# Patient Record
Sex: MALE | Race: WHITE | NOT HISPANIC OR LATINO | URBAN - METROPOLITAN AREA
[De-identification: names, ages, dates, MRNs, and addresses within clinical notes are randomized per-mention and may not be internally consistent; named-entity substitution may affect disease eponyms.]

---

## 2018-06-17 ENCOUNTER — EMERGENCY (EMERGENCY)
Facility: HOSPITAL | Age: 58
LOS: 1 days | Discharge: HOME | End: 2018-06-17
Attending: EMERGENCY MEDICINE
Payer: COMMERCIAL

## 2018-06-17 VITALS
HEART RATE: 70 BPM | SYSTOLIC BLOOD PRESSURE: 128 MMHG | WEIGHT: 259.93 LBS | RESPIRATION RATE: 21 BRPM | DIASTOLIC BLOOD PRESSURE: 58 MMHG | OXYGEN SATURATION: 98 % | TEMPERATURE: 98 F | HEIGHT: 69 IN

## 2018-06-17 LAB
ALBUMIN SERPL ELPH-MCNC: 4.2 G/DL — SIGNIFICANT CHANGE UP (ref 3.3–5.2)
ALP SERPL-CCNC: 64 U/L — SIGNIFICANT CHANGE UP (ref 40–120)
ALT FLD-CCNC: 22 U/L — SIGNIFICANT CHANGE UP
ANION GAP SERPL CALC-SCNC: 18 MMOL/L — HIGH (ref 5–17)
APTT BLD: 22.7 SEC — LOW (ref 27.5–37.4)
AST SERPL-CCNC: 19 U/L — SIGNIFICANT CHANGE UP
BASOPHILS # BLD AUTO: 0 K/UL — SIGNIFICANT CHANGE UP (ref 0–0.2)
BASOPHILS NFR BLD AUTO: 0.2 % — SIGNIFICANT CHANGE UP (ref 0–2)
BILIRUB SERPL-MCNC: 0.7 MG/DL — SIGNIFICANT CHANGE UP (ref 0.4–2)
BUN SERPL-MCNC: 15 MG/DL — SIGNIFICANT CHANGE UP (ref 8–20)
CALCIUM SERPL-MCNC: 9.5 MG/DL — SIGNIFICANT CHANGE UP (ref 8.6–10.2)
CHLORIDE SERPL-SCNC: 97 MMOL/L — LOW (ref 98–107)
CO2 SERPL-SCNC: 22 MMOL/L — SIGNIFICANT CHANGE UP (ref 22–29)
CREAT SERPL-MCNC: 1.25 MG/DL — SIGNIFICANT CHANGE UP (ref 0.5–1.3)
EOSINOPHIL # BLD AUTO: 0.3 K/UL — SIGNIFICANT CHANGE UP (ref 0–0.5)
EOSINOPHIL NFR BLD AUTO: 3 % — SIGNIFICANT CHANGE UP (ref 0–5)
GLUCOSE SERPL-MCNC: 190 MG/DL — HIGH (ref 70–115)
HCT VFR BLD CALC: 40.6 % — LOW (ref 42–52)
HGB BLD-MCNC: 14 G/DL — SIGNIFICANT CHANGE UP (ref 14–18)
INR BLD: 1.04 RATIO — SIGNIFICANT CHANGE UP (ref 0.88–1.16)
LIDOCAIN IGE QN: 48 U/L — SIGNIFICANT CHANGE UP (ref 22–51)
LYMPHOCYTES # BLD AUTO: 2.7 K/UL — SIGNIFICANT CHANGE UP (ref 1–4.8)
LYMPHOCYTES # BLD AUTO: 26.4 % — SIGNIFICANT CHANGE UP (ref 20–55)
MCHC RBC-ENTMCNC: 30.7 PG — SIGNIFICANT CHANGE UP (ref 27–31)
MCHC RBC-ENTMCNC: 34.5 G/DL — SIGNIFICANT CHANGE UP (ref 32–36)
MCV RBC AUTO: 89 FL — SIGNIFICANT CHANGE UP (ref 80–94)
MONOCYTES # BLD AUTO: 0.9 K/UL — HIGH (ref 0–0.8)
MONOCYTES NFR BLD AUTO: 8.7 % — SIGNIFICANT CHANGE UP (ref 3–10)
NEUTROPHILS # BLD AUTO: 6.2 K/UL — SIGNIFICANT CHANGE UP (ref 1.8–8)
NEUTROPHILS NFR BLD AUTO: 61 % — SIGNIFICANT CHANGE UP (ref 37–73)
NT-PROBNP SERPL-SCNC: 224 PG/ML — SIGNIFICANT CHANGE UP (ref 0–300)
PLATELET # BLD AUTO: 338 K/UL — SIGNIFICANT CHANGE UP (ref 150–400)
POTASSIUM SERPL-MCNC: 4 MMOL/L — SIGNIFICANT CHANGE UP (ref 3.5–5.3)
POTASSIUM SERPL-SCNC: 4 MMOL/L — SIGNIFICANT CHANGE UP (ref 3.5–5.3)
PROT SERPL-MCNC: 7.3 G/DL — SIGNIFICANT CHANGE UP (ref 6.6–8.7)
PROTHROM AB SERPL-ACNC: 11.4 SEC — SIGNIFICANT CHANGE UP (ref 9.8–12.7)
RBC # BLD: 4.56 M/UL — LOW (ref 4.6–6.2)
RBC # FLD: 13.1 % — SIGNIFICANT CHANGE UP (ref 11–15.6)
SODIUM SERPL-SCNC: 137 MMOL/L — SIGNIFICANT CHANGE UP (ref 135–145)
TROPONIN T SERPL-MCNC: <0.01 NG/ML — SIGNIFICANT CHANGE UP (ref 0–0.06)
TROPONIN T SERPL-MCNC: <0.01 NG/ML — SIGNIFICANT CHANGE UP (ref 0–0.06)
TSH SERPL-MCNC: 2.97 UIU/ML — SIGNIFICANT CHANGE UP (ref 0.27–4.2)
WBC # BLD: 10.2 K/UL — SIGNIFICANT CHANGE UP (ref 4.8–10.8)
WBC # FLD AUTO: 10.2 K/UL — SIGNIFICANT CHANGE UP (ref 4.8–10.8)

## 2018-06-17 PROCEDURE — 83690 ASSAY OF LIPASE: CPT

## 2018-06-17 PROCEDURE — 93010 ELECTROCARDIOGRAM REPORT: CPT | Mod: 59

## 2018-06-17 PROCEDURE — 83880 ASSAY OF NATRIURETIC PEPTIDE: CPT

## 2018-06-17 PROCEDURE — 80053 COMPREHEN METABOLIC PANEL: CPT

## 2018-06-17 PROCEDURE — 99284 EMERGENCY DEPT VISIT MOD MDM: CPT | Mod: 25

## 2018-06-17 PROCEDURE — 85027 COMPLETE CBC AUTOMATED: CPT

## 2018-06-17 PROCEDURE — 84484 ASSAY OF TROPONIN QUANT: CPT

## 2018-06-17 PROCEDURE — 96374 THER/PROPH/DIAG INJ IV PUSH: CPT

## 2018-06-17 PROCEDURE — 84443 ASSAY THYROID STIM HORMONE: CPT

## 2018-06-17 PROCEDURE — 36415 COLL VENOUS BLD VENIPUNCTURE: CPT

## 2018-06-17 PROCEDURE — 71045 X-RAY EXAM CHEST 1 VIEW: CPT

## 2018-06-17 PROCEDURE — 71045 X-RAY EXAM CHEST 1 VIEW: CPT | Mod: 26

## 2018-06-17 PROCEDURE — 85730 THROMBOPLASTIN TIME PARTIAL: CPT

## 2018-06-17 PROCEDURE — 99285 EMERGENCY DEPT VISIT HI MDM: CPT

## 2018-06-17 PROCEDURE — 85610 PROTHROMBIN TIME: CPT

## 2018-06-17 PROCEDURE — 93005 ELECTROCARDIOGRAM TRACING: CPT

## 2018-06-17 RX ORDER — ONDANSETRON 8 MG/1
4 TABLET, FILM COATED ORAL ONCE
Qty: 0 | Refills: 0 | Status: COMPLETED | OUTPATIENT
Start: 2018-06-17 | End: 2018-06-17

## 2018-06-17 RX ADMIN — ONDANSETRON 4 MILLIGRAM(S): 8 TABLET, FILM COATED ORAL at 19:45

## 2018-06-17 NOTE — ED PROVIDER NOTE - OBJECTIVE STATEMENT
56 y/o male pt with PMHx presents to the ED, brought in by ambulance, c/o nausea, vomiting, diaphoresis onset 45 min ago. Associated symptoms include sweating. Pt notes he took all his medicine today. He also notes he did not eat anything today. Denies CP or any pain in general. He also denies being a smoker or ETOH usage. No further complaints at this time.   PMD: Dr. Kaden Robison 58 y/o male pt with PMHx presents to the ED, brought in by ambulance, c/o nausea, vomiting, diaphoresis onset 45 min ago. Pt notes he took all his medicine today. He also notes he did not eat anything today. Denies CP or any pain in general. Nonsmoker. denies fever. denies HA or neck pain. no chest pain or sob. no abd pain.  no urinary f/u/d. no back pain. no motor or sensory deficits. denies illicit drug use. no recent travel. no rash. no other acute issues symptoms or concerns   PMD: Dr. Kaden Robison

## 2018-06-17 NOTE — ED ADULT NURSE REASSESSMENT NOTE - NS ED NURSE REASSESS COMMENT FT1
Patient a&ox4, able to make needs known., denies any pain or discomfort, not in respiratory distress. BP= 91/60, Dr. Harley aware, suggested IV fluids but MD give no order at this time.
As per MD, remove o2. Patient o2sat is 96% room air, denies sob or respiratory distress.

## 2018-06-17 NOTE — ED ADULT TRIAGE NOTE - CHIEF COMPLAINT QUOTE
Patient diaphoretic, complaining of nausea, & dizziness, vomiting upon arrival to ED. Stated sudden onset when eating this evening. Took 324mg baby aspirin prior to arrival. Stated family told him he "passed out." Dr. Harley at bedside for eval.

## 2018-06-17 NOTE — ED ADULT NURSE NOTE - DISCHARGE TEACHING
F/u with PMD In 24-48 hours, if with new or worsening symptoms call 911 or go to the nearest hospital

## 2018-06-17 NOTE — ED PROVIDER NOTE - NS ED NOTE AC HIGH RISK COUNTRIES
Benefits, risks, and possible complications of procedure explained to patient/caregiver who verbalized understanding and gave verbal consent. Benefits, risks, and possible complications of procedure explained to patient/caregiver who verbalized understanding and gave verbal consent. Benefits, risks, and possible complications of procedure explained to patient/caregiver who verbalized understanding and gave written consent. No

## 2018-06-17 NOTE — ED PROVIDER NOTE - PROGRESS NOTE DETAILS
b/l UE at least 3-/5, b/l LE at least 3-/5
Scribe Statement (Attending): I Crissy Pierre attest that this documentation has been prepared under the direction and in the presence of Doctor Harley

## 2018-06-17 NOTE — ED ADULT NURSE NOTE - OBJECTIVE STATEMENT
Received patient in B3R, a&ox4, able to make needs known. Patient received on o2 @ 2 lpm via nc, nsr on cm. Patient reports of episode of nausea, vomiting and diaphoresis onset after dinner. Patient's sister added patient passed out for 30 seconds then woke up before EMS arrived. Patient denies any pain or discomfort, denies chest pain. Patient received with right ac iv #18. Patient noted with no active vomiting, no nausea and diaphoresis at this time. Plan of care and wait time explained, patient verbalized understanding. Patient not in respiratory distress. PMH: MI, cardiac stents x 1. To continue to monitor.

## 2018-06-18 VITALS
OXYGEN SATURATION: 98 % | RESPIRATION RATE: 20 BRPM | DIASTOLIC BLOOD PRESSURE: 74 MMHG | TEMPERATURE: 98 F | SYSTOLIC BLOOD PRESSURE: 120 MMHG | HEART RATE: 70 BPM

## 2018-10-09 ENCOUNTER — APPOINTMENT (OUTPATIENT)
Dept: ENDOCRINOLOGY | Facility: CLINIC | Age: 58
End: 2018-10-09
Payer: COMMERCIAL

## 2018-10-09 VITALS
BODY MASS INDEX: 40.29 KG/M2 | HEIGHT: 69 IN | WEIGHT: 272 LBS | SYSTOLIC BLOOD PRESSURE: 100 MMHG | DIASTOLIC BLOOD PRESSURE: 60 MMHG | HEART RATE: 75 BPM

## 2018-10-09 DIAGNOSIS — Z78.9 OTHER SPECIFIED HEALTH STATUS: ICD-10-CM

## 2018-10-09 DIAGNOSIS — Z86.39 PERSONAL HISTORY OF OTHER ENDOCRINE, NUTRITIONAL AND METABOLIC DISEASE: ICD-10-CM

## 2018-10-09 DIAGNOSIS — I25.2 OLD MYOCARDIAL INFARCTION: ICD-10-CM

## 2018-10-09 PROCEDURE — 99244 OFF/OP CNSLTJ NEW/EST MOD 40: CPT | Mod: 25

## 2018-10-09 PROCEDURE — 95250 CONT GLUC MNTR PHYS/QHP EQP: CPT

## 2018-10-31 ENCOUNTER — APPOINTMENT (OUTPATIENT)
Dept: ENDOCRINOLOGY | Facility: CLINIC | Age: 58
End: 2018-10-31
Payer: COMMERCIAL

## 2018-10-31 VITALS
HEART RATE: 88 BPM | SYSTOLIC BLOOD PRESSURE: 132 MMHG | BODY MASS INDEX: 38.95 KG/M2 | DIASTOLIC BLOOD PRESSURE: 84 MMHG | WEIGHT: 263 LBS | HEIGHT: 69 IN

## 2018-10-31 PROCEDURE — 95251 CONT GLUC MNTR ANALYSIS I&R: CPT

## 2018-10-31 PROCEDURE — 99214 OFFICE O/P EST MOD 30 MIN: CPT | Mod: 25

## 2019-01-14 ENCOUNTER — APPOINTMENT (OUTPATIENT)
Dept: ENDOCRINOLOGY | Facility: CLINIC | Age: 59
End: 2019-01-14

## 2019-02-14 ENCOUNTER — MEDICATION RENEWAL (OUTPATIENT)
Age: 59
End: 2019-02-14

## 2019-02-14 ENCOUNTER — RX CHANGE (OUTPATIENT)
Age: 59
End: 2019-02-14

## 2019-04-16 LAB
ALBUMIN SERPL ELPH-MCNC: 4.5 G/DL
ALP BLD-CCNC: 69 U/L
ALT SERPL-CCNC: 33 U/L
ANION GAP SERPL CALC-SCNC: 16 MMOL/L
AST SERPL-CCNC: 20 U/L
BASOPHILS # BLD AUTO: 0.05 K/UL
BASOPHILS NFR BLD AUTO: 0.6 %
BILIRUB SERPL-MCNC: 0.4 MG/DL
BUN SERPL-MCNC: 15 MG/DL
CALCIUM SERPL-MCNC: 9.8 MG/DL
CHLORIDE SERPL-SCNC: 100 MMOL/L
CHOLEST SERPL-MCNC: 279 MG/DL
CHOLEST/HDLC SERPL: 6.5 RATIO
CO2 SERPL-SCNC: 26 MMOL/L
CREAT SERPL-MCNC: 0.92 MG/DL
CREAT SPEC-SCNC: 93 MG/DL
EOSINOPHIL # BLD AUTO: 0.29 K/UL
EOSINOPHIL NFR BLD AUTO: 3.6 %
GLUCOSE SERPL-MCNC: 279 MG/DL
HBA1C MFR BLD HPLC: 10.3 %
HCT VFR BLD CALC: 43.1 %
HDLC SERPL-MCNC: 43 MG/DL
HGB BLD-MCNC: 13.7 G/DL
IMM GRANULOCYTES NFR BLD AUTO: 0.4 %
LDLC SERPL CALC-MCNC: 176 MG/DL
LDLC SERPL DIRECT ASSAY-MCNC: 204 MG/DL
LYMPHOCYTES # BLD AUTO: 2.28 K/UL
LYMPHOCYTES NFR BLD AUTO: 27.9 %
MAN DIFF?: NORMAL
MCHC RBC-ENTMCNC: 30.4 PG
MCHC RBC-ENTMCNC: 31.8 GM/DL
MCV RBC AUTO: 95.8 FL
MICROALBUMIN 24H UR DL<=1MG/L-MCNC: <1.2 MG/DL
MICROALBUMIN/CREAT 24H UR-RTO: NORMAL MG/G
MONOCYTES # BLD AUTO: 0.86 K/UL
MONOCYTES NFR BLD AUTO: 10.5 %
NEUTROPHILS # BLD AUTO: 4.65 K/UL
NEUTROPHILS NFR BLD AUTO: 57 %
PLATELET # BLD AUTO: 312 K/UL
POTASSIUM SERPL-SCNC: 5.3 MMOL/L
PROT SERPL-MCNC: 6.9 G/DL
RBC # BLD: 4.5 M/UL
RBC # FLD: 12.8 %
SODIUM SERPL-SCNC: 142 MMOL/L
TRIGL SERPL-MCNC: 300 MG/DL
WBC # FLD AUTO: 8.16 K/UL

## 2019-04-18 ENCOUNTER — APPOINTMENT (OUTPATIENT)
Dept: ENDOCRINOLOGY | Facility: CLINIC | Age: 59
End: 2019-04-18
Payer: COMMERCIAL

## 2019-04-18 VITALS
SYSTOLIC BLOOD PRESSURE: 116 MMHG | HEIGHT: 69 IN | HEART RATE: 77 BPM | WEIGHT: 277 LBS | DIASTOLIC BLOOD PRESSURE: 74 MMHG | BODY MASS INDEX: 41.03 KG/M2

## 2019-04-18 PROCEDURE — 99214 OFFICE O/P EST MOD 30 MIN: CPT

## 2019-04-18 NOTE — ASSESSMENT
[Carbohydrate Consistent Diet] : carbohydrate consistent diet [Hypoglycemia Management] : hypoglycemia management [Diabetes Foot Care] : diabetes foot care [Long Term Vascular Complications] : long term vascular complications of diabetes [Importance of Diet and Exercise] : importance of diet and exercise to improve glycemic control, achieve weight loss and improve cardiovascular health [Self Monitoring of Blood Glucose] : self monitoring of blood glucose [FreeTextEntry1] : DM2 poorly controlled associated with HTN. HLD and morbid obesity. he gained another 14 lbs and A1c is up 10.3\par continue MF 1000 mg BID\par increase levemir to 65 u Hs (he forgot to do it) \par Humalog 30 u BID  w meals. \par discussed diet and exercise\par encouraged more exercise walking 30 min 3 x week\par eye exam August 2018: reportedly no DR \par bgs 3x day\par \par HTN:  bp at target on meds. Continue current management.\par discussed low salt diet \par \par HLD:  LDl at target. stop lipitor with crestor  40 mg qd \par \par obesity:  discussed diet and exercise\par encouraged more exercise walking 30 min 3 x week

## 2019-04-18 NOTE — PHYSICAL EXAM
[Alert] : alert [No Acute Distress] : no acute distress [Well Nourished] : well nourished [Well Developed] : well developed [Normal Sclera/Conjunctiva] : normal sclera/conjunctiva [EOMI] : extra ocular movement intact [No Proptosis] : no proptosis [Normal Oropharynx] : the oropharynx was normal [Thyroid Not Enlarged] : the thyroid was not enlarged [No Thyroid Nodules] : there were no palpable thyroid nodules [No Respiratory Distress] : no respiratory distress [No Accessory Muscle Use] : no accessory muscle use [Clear to Auscultation] : lungs were clear to auscultation bilaterally [Normal Rate] : heart rate was normal  [Normal S1, S2] : normal S1 and S2 [Regular Rhythm] : with a regular rhythm [Pedal Pulses Normal] : the pedal pulses are present [No Edema] : there was no peripheral edema [Normal Bowel Sounds] : normal bowel sounds [Soft] : abdomen soft [Not Tender] : non-tender [Not Distended] : not distended [Post Cervical Nodes] : posterior cervical nodes [Anterior Cervical Nodes] : anterior cervical nodes [Normal] : normal and non tender [Spine Straight] : spine straight [No Stigmata of Cushings Syndrome] : no stigmata of cushings syndrome [Normal Gait] : normal gait [Normal Strength/Tone] : muscle strength and tone were normal [No Rash] : no rash [Normal Reflexes] : deep tendon reflexes were 2+ and symmetric [No Tremors] : no tremors [Oriented x3] : oriented to person, place, and time [Acanthosis Nigricans] : no acanthosis nigricans

## 2019-05-01 ENCOUNTER — RX RENEWAL (OUTPATIENT)
Age: 59
End: 2019-05-01

## 2019-05-14 ENCOUNTER — RX RENEWAL (OUTPATIENT)
Age: 59
End: 2019-05-14

## 2019-06-06 ENCOUNTER — APPOINTMENT (OUTPATIENT)
Dept: ENDOCRINOLOGY | Facility: CLINIC | Age: 59
End: 2019-06-06
Payer: COMMERCIAL

## 2019-06-06 VITALS
WEIGHT: 281 LBS | HEIGHT: 69 IN | HEART RATE: 121 BPM | DIASTOLIC BLOOD PRESSURE: 90 MMHG | SYSTOLIC BLOOD PRESSURE: 140 MMHG | BODY MASS INDEX: 41.62 KG/M2

## 2019-06-06 LAB — HBA1C MFR BLD HPLC: 9.2

## 2019-06-06 PROCEDURE — 83036 HEMOGLOBIN GLYCOSYLATED A1C: CPT | Mod: QW

## 2019-06-06 PROCEDURE — 99214 OFFICE O/P EST MOD 30 MIN: CPT | Mod: 25

## 2019-06-06 NOTE — PHYSICAL EXAM
[No Acute Distress] : no acute distress [Alert] : alert [Well Nourished] : well nourished [EOMI] : extra ocular movement intact [Well Developed] : well developed [Normal Sclera/Conjunctiva] : normal sclera/conjunctiva [No Proptosis] : no proptosis [Normal Oropharynx] : the oropharynx was normal [Thyroid Not Enlarged] : the thyroid was not enlarged [No Thyroid Nodules] : there were no palpable thyroid nodules [No Respiratory Distress] : no respiratory distress [No Accessory Muscle Use] : no accessory muscle use [Clear to Auscultation] : lungs were clear to auscultation bilaterally [Normal Rate] : heart rate was normal  [Regular Rhythm] : with a regular rhythm [Normal S1, S2] : normal S1 and S2 [Normal Bowel Sounds] : normal bowel sounds [Pedal Pulses Normal] : the pedal pulses are present [No Edema] : there was no peripheral edema [Soft] : abdomen soft [Not Tender] : non-tender [Post Cervical Nodes] : posterior cervical nodes [Not Distended] : not distended [Normal] : normal and non tender [Anterior Cervical Nodes] : anterior cervical nodes [Spine Straight] : spine straight [No Stigmata of Cushings Syndrome] : no stigmata of cushings syndrome [Normal Strength/Tone] : muscle strength and tone were normal [Normal Gait] : normal gait [No Rash] : no rash [Normal Reflexes] : deep tendon reflexes were 2+ and symmetric [Oriented x3] : oriented to person, place, and time [No Tremors] : no tremors [Acanthosis Nigricans] : no acanthosis nigricans

## 2019-06-06 NOTE — ASSESSMENT
[Carbohydrate Consistent Diet] : carbohydrate consistent diet [Importance of Diet and Exercise] : importance of diet and exercise to improve glycemic control, achieve weight loss and improve cardiovascular health [Self Monitoring of Blood Glucose] : self monitoring of blood glucose [Long Term Vascular Complications] : long term vascular complications of diabetes [Insulin Self-Administration] : insulin self-administration [FreeTextEntry1] : DM2 poorly controlled associated with HTN. HLD and morbid obesity. He gained another 4 lbs . A1c down to 9.2. But he had a recent URI and he takes steroids for 7 days. All Bgs are above 300. \par continue MF 1000 mg BID\par contineu levemir 66 u HS and add 10-15 u am for the days with steroids.\par increase humalog 34 u BID  w meals. \par discussed diet and exercise\par encouraged more exercise walking 30 min 3 x week\par eye exam August 2018: reportedly no DR \par bgs 3x day\par no labs today \par fax logbook in 2 weeks after steroids finished. \par he is working on better diet habits \par \par HTN:  bp at target on meds. Continue current management.\par discussed low salt diet \par \par HLD:  LDl at target. \par continue crestor 40 mg qd and check labs in 2 mo.\par \par obesity:  discussed diet and exercise\par encouraged more exercise walking 30 min 3 x week

## 2019-08-05 ENCOUNTER — TRANSCRIPTION ENCOUNTER (OUTPATIENT)
Age: 59
End: 2019-08-05

## 2019-08-06 ENCOUNTER — RESULT REVIEW (OUTPATIENT)
Age: 59
End: 2019-08-06

## 2019-08-06 ENCOUNTER — OUTPATIENT (OUTPATIENT)
Dept: OUTPATIENT SERVICES | Facility: HOSPITAL | Age: 59
LOS: 1 days | End: 2019-08-06
Payer: COMMERCIAL

## 2019-08-06 DIAGNOSIS — K63.5 POLYP OF COLON: ICD-10-CM

## 2019-08-06 DIAGNOSIS — K21.0 GASTRO-ESOPHAGEAL REFLUX DISEASE WITH ESOPHAGITIS: ICD-10-CM

## 2019-08-06 PROCEDURE — 43239 EGD BIOPSY SINGLE/MULTIPLE: CPT

## 2019-08-06 PROCEDURE — 45380 COLONOSCOPY AND BIOPSY: CPT | Mod: PT,XS

## 2019-08-06 PROCEDURE — 88313 SPECIAL STAINS GROUP 2: CPT | Mod: 26

## 2019-08-06 PROCEDURE — 82962 GLUCOSE BLOOD TEST: CPT

## 2019-08-06 PROCEDURE — 45385 COLONOSCOPY W/LESION REMOVAL: CPT | Mod: PT

## 2019-08-06 PROCEDURE — 88305 TISSUE EXAM BY PATHOLOGIST: CPT | Mod: 26

## 2019-08-06 PROCEDURE — 88305 TISSUE EXAM BY PATHOLOGIST: CPT

## 2019-08-06 PROCEDURE — 88313 SPECIAL STAINS GROUP 2: CPT

## 2019-08-07 LAB — SURGICAL PATHOLOGY STUDY: SIGNIFICANT CHANGE UP

## 2019-08-09 ENCOUNTER — RX RENEWAL (OUTPATIENT)
Age: 59
End: 2019-08-09

## 2019-08-10 ENCOUNTER — RX RENEWAL (OUTPATIENT)
Age: 59
End: 2019-08-10

## 2019-08-28 ENCOUNTER — APPOINTMENT (OUTPATIENT)
Dept: ENDOCRINOLOGY | Facility: CLINIC | Age: 59
End: 2019-08-28

## 2019-10-22 ENCOUNTER — RX RENEWAL (OUTPATIENT)
Age: 59
End: 2019-10-22

## 2019-10-22 ENCOUNTER — MEDICATION RENEWAL (OUTPATIENT)
Age: 59
End: 2019-10-22

## 2020-01-27 ENCOUNTER — RX RENEWAL (OUTPATIENT)
Age: 60
End: 2020-01-27

## 2020-02-10 ENCOUNTER — LABORATORY RESULT (OUTPATIENT)
Age: 60
End: 2020-02-10

## 2020-02-11 ENCOUNTER — APPOINTMENT (OUTPATIENT)
Dept: ENDOCRINOLOGY | Facility: CLINIC | Age: 60
End: 2020-02-11
Payer: COMMERCIAL

## 2020-02-11 VITALS
BODY MASS INDEX: 42.95 KG/M2 | HEIGHT: 69 IN | WEIGHT: 290 LBS | DIASTOLIC BLOOD PRESSURE: 90 MMHG | SYSTOLIC BLOOD PRESSURE: 130 MMHG | HEART RATE: 72 BPM

## 2020-02-11 PROCEDURE — 99214 OFFICE O/P EST MOD 30 MIN: CPT

## 2020-04-02 ENCOUNTER — RX RENEWAL (OUTPATIENT)
Age: 60
End: 2020-04-02

## 2020-05-06 NOTE — ASSESSMENT
[Carbohydrate Consistent Diet] : carbohydrate consistent diet [Importance of Diet and Exercise] : importance of diet and exercise to improve glycemic control, achieve weight loss and improve cardiovascular health [Self Monitoring of Blood Glucose] : self monitoring of blood glucose [Long Term Vascular Complications] : long term vascular complications of diabetes [Insulin Self-Administration] : insulin self-administration [FreeTextEntry1] : DM2 poorly controlled associated with HTN. HLD and morbid obesity. He gained another 9 lbs . A1c  9.3\par he admits stopping long acting insulin for a month. BMi gradually going higher. \par \par continue MF 1000 mg BID\par restart levemir 66 u HS \par continue  humalog 60 u BID  w meals. \par discussed diet and exercise\par encouraged more exercise walking 30 min 3 x week\par Discussed complications of diabetes at length including MI/CVA/ESRD/dialysis/blindness/amputations/infections\par Importance of blood glucose monitoring discussed. Targets reviewed. Recommended pt try to keep blood glucose level below 130 (110) before meals and below 160 (140) two hours after meals.\par eye exam August 2018: reportedly no DR \par bgs 4 x day\par all lab results reviewed and discussed with patient. All questions answered\par josue/ c ratio normal. \par he wants to start exercising. He might consider bariatric surgery if he does  not loose any weight till next appt. \par Encouraged to go for a consult but reluctant. \par \par HTN:  bp at target on meds. Continue current management.\par encouraged low salt diet \par \par HLD:  LDl at target.  CAd s/p MI . \par continue crestor 40 mg qd . He will discuss it in 2 weeks with cardiologist \par \par obesity:  discussed diet and exercise\par encouraged more exercise walking 30 min 3 x week\par \par This patient with diabetes (E11.65) performs 4 glucose checks per day utilizing a home blood glucose monitor. The patient is treated with insulin via 4 injections daily. This patient requires frequent adjustments to their insulin treatment on the basis of therapeutic continuous glucose monitoring results by adjusting fixed insulin doses. In addition, the patient has been to our office for an evaluation of their diabetes control within the past 6 months. \par \par

## 2020-05-06 NOTE — PHYSICAL EXAM
[No Acute Distress] : no acute distress [Well Nourished] : well nourished [Alert] : alert [Well Developed] : well developed [Normal Sclera/Conjunctiva] : normal sclera/conjunctiva [EOMI] : extra ocular movement intact [No Proptosis] : no proptosis [Normal Oropharynx] : the oropharynx was normal [Thyroid Not Enlarged] : the thyroid was not enlarged [No Thyroid Nodules] : there were no palpable thyroid nodules [No Respiratory Distress] : no respiratory distress [No Accessory Muscle Use] : no accessory muscle use [Clear to Auscultation] : lungs were clear to auscultation bilaterally [Regular Rhythm] : with a regular rhythm [Normal Rate] : heart rate was normal  [Normal S1, S2] : normal S1 and S2 [Normal Bowel Sounds] : normal bowel sounds [Pedal Pulses Normal] : the pedal pulses are present [No Edema] : there was no peripheral edema [Not Distended] : not distended [Soft] : abdomen soft [Not Tender] : non-tender [Post Cervical Nodes] : posterior cervical nodes [Anterior Cervical Nodes] : anterior cervical nodes [Spine Straight] : spine straight [Normal] : normal and non tender [No Stigmata of Cushings Syndrome] : no stigmata of cushings syndrome [No Rash] : no rash [Normal Gait] : normal gait [No Tremors] : no tremors [Oriented x3] : oriented to person, place, and time [Normal Strength/Tone] : muscle strength and tone were normal [No Spinal Tenderness] : no spinal tenderness [Axillary Nodes] : axillary nodes [Normal Reflexes] : deep tendon reflexes were 2+ and symmetric [Acanthosis Nigricans] : no acanthosis nigricans

## 2020-05-12 ENCOUNTER — RX RENEWAL (OUTPATIENT)
Age: 60
End: 2020-05-12

## 2020-05-19 ENCOUNTER — APPOINTMENT (OUTPATIENT)
Dept: ENDOCRINOLOGY | Facility: CLINIC | Age: 60
End: 2020-05-19
Payer: COMMERCIAL

## 2020-05-19 VITALS
BODY MASS INDEX: 44.43 KG/M2 | SYSTOLIC BLOOD PRESSURE: 130 MMHG | DIASTOLIC BLOOD PRESSURE: 75 MMHG | HEART RATE: 72 BPM | WEIGHT: 300 LBS | HEIGHT: 69 IN

## 2020-05-19 LAB
ALBUMIN SERPL ELPH-MCNC: 4.5 G/DL
ALP BLD-CCNC: 71 U/L
ALT SERPL-CCNC: 39 U/L
ANION GAP SERPL CALC-SCNC: 16 MMOL/L
AST SERPL-CCNC: 25 U/L
BASOPHILS # BLD AUTO: 0.08 K/UL
BASOPHILS NFR BLD AUTO: 0.8 %
BILIRUB SERPL-MCNC: 0.4 MG/DL
BUN SERPL-MCNC: 16 MG/DL
CALCIUM SERPL-MCNC: 9.4 MG/DL
CHLORIDE SERPL-SCNC: 101 MMOL/L
CHOLEST SERPL-MCNC: 276 MG/DL
CHOLEST/HDLC SERPL: 5.1 RATIO
CO2 SERPL-SCNC: 24 MMOL/L
CREAT SERPL-MCNC: 0.84 MG/DL
CREAT SPEC-SCNC: 105 MG/DL
EOSINOPHIL # BLD AUTO: 0.38 K/UL
EOSINOPHIL NFR BLD AUTO: 3.8 %
ESTIMATED AVERAGE GLUCOSE: 192 MG/DL
GLUCOSE SERPL-MCNC: 175 MG/DL
HBA1C MFR BLD HPLC: 8.3 %
HCT VFR BLD CALC: 46.4 %
HDLC SERPL-MCNC: 54 MG/DL
HGB BLD-MCNC: 14.8 G/DL
IMM GRANULOCYTES NFR BLD AUTO: 0.8 %
LDLC SERPL CALC-MCNC: 144 MG/DL
LDLC SERPL DIRECT ASSAY-MCNC: 184 MG/DL
LYMPHOCYTES # BLD AUTO: 2.55 K/UL
LYMPHOCYTES NFR BLD AUTO: 25.2 %
MAN DIFF?: NORMAL
MCHC RBC-ENTMCNC: 31 PG
MCHC RBC-ENTMCNC: 31.9 GM/DL
MCV RBC AUTO: 97.3 FL
MICROALBUMIN 24H UR DL<=1MG/L-MCNC: <1.2 MG/DL
MICROALBUMIN/CREAT 24H UR-RTO: NORMAL MG/G
MONOCYTES # BLD AUTO: 0.93 K/UL
MONOCYTES NFR BLD AUTO: 9.2 %
NEUTROPHILS # BLD AUTO: 6.08 K/UL
NEUTROPHILS NFR BLD AUTO: 60.2 %
PLATELET # BLD AUTO: 300 K/UL
POTASSIUM SERPL-SCNC: 4.9 MMOL/L
PROT SERPL-MCNC: 7.2 G/DL
RBC # BLD: 4.77 M/UL
RBC # FLD: 13.2 %
SODIUM SERPL-SCNC: 141 MMOL/L
TRIGL SERPL-MCNC: 390 MG/DL
WBC # FLD AUTO: 10.1 K/UL

## 2020-05-19 PROCEDURE — 99214 OFFICE O/P EST MOD 30 MIN: CPT

## 2020-05-19 NOTE — ASSESSMENT
[Diabetes Foot Care] : diabetes foot care [Importance of Diet and Exercise] : importance of diet and exercise to improve glycemic control, achieve weight loss and improve cardiovascular health [Carbohydrate Consistent Diet] : carbohydrate consistent diet [FreeTextEntry1] : DM2 moderately uncontrolled with a1c 8.3. HTN. HLD and morbid obesity. He gained another 9 lbs . A1c  9.3\par BMi gradually gaining weight, another 10 lbs since last visit. He overeats during covid crisis. \par bgs am = 125 -130 before dinner 200  lunch 160-175  bedtime 125 \par advised to cut down on carbs and portion size\par continue MF 1000 mg BID\par continue  levemir 66 u HS \par increase humalog 66 u BID  w meals. \par encouraged more exercise walking 30 min 3 x week\par Discussed complications of diabetes at length including MI/CVA/ESRD/dialysis/blindness/amputations/infections\par Importance of blood glucose monitoring discussed. Targets reviewed. Recommended pt try to keep blood glucose level below 130 (110) before meals and below 160 (140) two hours after meals.\par eye exam March 2020: reportedly no DR. continue to monitor\par podiatry followup \par bgs 3x day\par josue/ c ratio normal. \par He might consider bariatric surgery but he wants to drinks seltzer and he cannot if he has this surgery so he declines for now. He would consider if weight > 300 lbs \par \par HTN:  bp at target on meds. Continue current management.\par encouraged low salt diet \par \par HLD:  LDL at target.  CAd s/p MI . \par continue crestor 40 mg qd . He will discuss it in 2 weeks with cardiologist about further management. \par \par obesity:\par encouraged more exercise walking 30 min 3 x week

## 2020-05-19 NOTE — PHYSICAL EXAM
[No Acute Distress] : no acute distress [Well Nourished] : well nourished [Alert] : alert [Well Developed] : well developed [Normal Sclera/Conjunctiva] : normal sclera/conjunctiva [EOMI] : extra ocular movement intact [No Proptosis] : no proptosis [Thyroid Not Enlarged] : the thyroid was not enlarged [Normal Oropharynx] : the oropharynx was normal [No Respiratory Distress] : no respiratory distress [No Thyroid Nodules] : no palpable thyroid nodules [Clear to Auscultation] : lungs were clear to auscultation bilaterally [No Accessory Muscle Use] : no accessory muscle use [Normal Rate] : heart rate was normal [Normal S1, S2] : normal S1 and S2 [Regular Rhythm] : with a regular rhythm [No Edema] : no peripheral edema [Pedal Pulses Normal] : the pedal pulses are present [Normal Bowel Sounds] : normal bowel sounds [Not Distended] : not distended [Not Tender] : non-tender [Normal Posterior Cervical Nodes] : no posterior cervical lymphadenopathy [Normal Anterior Cervical Nodes] : no anterior cervical lymphadenopathy [Soft] : abdomen soft [No Stigmata of Cushings Syndrome] : no stigmata of Cushings Syndrome [Spine Straight] : spine straight [Normal Gait] : normal gait [No Rash] : no rash [No Tremors] : no tremors [Oriented x3] : oriented to person, place, and time [No Spinal Tenderness] : no spinal tenderness [Normal Reflexes] : deep tendon reflexes were 2+ and symmetric [Normal Strength/Tone] : muscle strength and tone were normal [Acanthosis Nigricans] : no acanthosis nigricans

## 2020-05-27 ENCOUNTER — RX RENEWAL (OUTPATIENT)
Age: 60
End: 2020-05-27

## 2020-07-27 ENCOUNTER — RX RENEWAL (OUTPATIENT)
Age: 60
End: 2020-07-27

## 2020-09-17 ENCOUNTER — RX RENEWAL (OUTPATIENT)
Age: 60
End: 2020-09-17

## 2020-10-05 ENCOUNTER — APPOINTMENT (OUTPATIENT)
Dept: ENDOCRINOLOGY | Facility: CLINIC | Age: 60
End: 2020-10-05

## 2020-10-21 LAB
ALBUMIN SERPL ELPH-MCNC: 4.2 G/DL
ALP BLD-CCNC: 68 U/L
ALT SERPL-CCNC: 36 U/L
ANION GAP SERPL CALC-SCNC: 13 MMOL/L
AST SERPL-CCNC: 28 U/L
BASOPHILS # BLD AUTO: 0.06 K/UL
BASOPHILS NFR BLD AUTO: 0.7 %
BILIRUB SERPL-MCNC: 0.4 MG/DL
BUN SERPL-MCNC: 18 MG/DL
CALCIUM SERPL-MCNC: 9.2 MG/DL
CHLORIDE SERPL-SCNC: 100 MMOL/L
CHOLEST SERPL-MCNC: 247 MG/DL
CO2 SERPL-SCNC: 24 MMOL/L
CREAT SERPL-MCNC: 0.82 MG/DL
CREAT SPEC-SCNC: 101 MG/DL
EOSINOPHIL # BLD AUTO: 0.31 K/UL
EOSINOPHIL NFR BLD AUTO: 3.7 %
ESTIMATED AVERAGE GLUCOSE: 214 MG/DL
GLUCOSE SERPL-MCNC: 234 MG/DL
HBA1C MFR BLD HPLC: 9.1 %
HCT VFR BLD CALC: 43.9 %
HDLC SERPL-MCNC: 50 MG/DL
HGB BLD-MCNC: 13.8 G/DL
IMM GRANULOCYTES NFR BLD AUTO: 0.7 %
LDLC SERPL CALC-MCNC: 161 MG/DL
LDLC SERPL DIRECT ASSAY-MCNC: 163 MG/DL
LYMPHOCYTES # BLD AUTO: 2.17 K/UL
LYMPHOCYTES NFR BLD AUTO: 25.7 %
MAN DIFF?: NORMAL
MCHC RBC-ENTMCNC: 30.3 PG
MCHC RBC-ENTMCNC: 31.4 GM/DL
MCV RBC AUTO: 96.3 FL
MICROALBUMIN 24H UR DL<=1MG/L-MCNC: <1.2 MG/DL
MICROALBUMIN/CREAT 24H UR-RTO: NORMAL MG/G
MONOCYTES # BLD AUTO: 0.91 K/UL
MONOCYTES NFR BLD AUTO: 10.8 %
NEUTROPHILS # BLD AUTO: 4.94 K/UL
NEUTROPHILS NFR BLD AUTO: 58.4 %
NONHDLC SERPL-MCNC: 197 MG/DL
PLATELET # BLD AUTO: 310 K/UL
POTASSIUM SERPL-SCNC: 5 MMOL/L
PROT SERPL-MCNC: 6.6 G/DL
RBC # BLD: 4.56 M/UL
RBC # FLD: 13 %
SODIUM SERPL-SCNC: 137 MMOL/L
TRIGL SERPL-MCNC: 177 MG/DL
WBC # FLD AUTO: 8.45 K/UL

## 2020-10-27 ENCOUNTER — APPOINTMENT (OUTPATIENT)
Dept: ENDOCRINOLOGY | Facility: CLINIC | Age: 60
End: 2020-10-27
Payer: COMMERCIAL

## 2020-10-27 VITALS
SYSTOLIC BLOOD PRESSURE: 150 MMHG | BODY MASS INDEX: 45.93 KG/M2 | TEMPERATURE: 98.2 F | DIASTOLIC BLOOD PRESSURE: 66 MMHG | WEIGHT: 310.13 LBS | RESPIRATION RATE: 20 BRPM | HEIGHT: 69 IN | HEART RATE: 113 BPM | OXYGEN SATURATION: 97 %

## 2020-10-27 PROCEDURE — 99204 OFFICE O/P NEW MOD 45 MIN: CPT

## 2020-10-27 PROCEDURE — 99214 OFFICE O/P EST MOD 30 MIN: CPT

## 2020-10-27 PROCEDURE — 99072 ADDL SUPL MATRL&STAF TM PHE: CPT

## 2020-10-27 RX ORDER — DULAGLUTIDE 1.5 MG/.5ML
1.5 INJECTION, SOLUTION SUBCUTANEOUS
Refills: 0 | Status: DISCONTINUED | COMMUNITY
End: 2020-10-27

## 2020-10-27 NOTE — ASSESSMENT
[Carbohydrate Consistent Diet] : carbohydrate consistent diet [Importance of Diet and Exercise] : importance of diet and exercise to improve glycemic control, achieve weight loss and improve cardiovascular health [Exercise/Effect on Glucose] : exercise/effect on glucose [Retinopathy Screening] : Patient was referred to ophthalmology for retinopathy screening [FreeTextEntry1] : DM2 moderately uncontrolled in patient with  HTN. HLD and morbid obesity. He gained another 10 lbs. \par At every visits he comes with another 10 lbs. A1c  9.1\par \par bgs am 150's  lunch 200-210   dinner = 250  bedtime 150-160 \par continue MF 1000 mg BID\par continue  levemir 80 u has and split in 2 shots \par increase Humalog to 75 u tID  meals. \par increase humalog 66 u BID  w meals. \par extensive discussion about meals but he continues to eat carbs but he claims everything is without carbs. \par referral for bariatric surgery \par encouraged more exercise walking 30 min 3 x week\par Importance of blood glucose monitoring discussed. Targets reviewed. Recommended pt try to keep blood glucose level below 130 (110) before meals and below 160 (140) two hours after meals.\par eye exam March 2020\par bgs 4 x day\par josue/ c ratio normal. continue ACEI \par flushot recommended strongly since diabetic\par \par HTN:   bp at target on meds. Continue current management.\par I advised low fat/low cholesterol diet, low salt diet, and weight loss\par \par HLD:   CAD s/ p MI . LDL very high again 161.  . \par continue statin BID. Cardiologist adjusted meds recently. \par low fat/low cholesterol diet and weight loss advised\par  \par obesity: discuss the need for bariatric surgery. \par he needs to start exercising and walking but he declines. he says he works at work \par encouraged more exercise walking 30 min 3 x week

## 2020-10-27 NOTE — HISTORY OF PRESENT ILLNESS
Nursing Note by Lu Martinez CMA at 12/20/17 09:25 AM     Author:  Lu Martinez CMA Service:  (none) Author Type:  Certified Medical Assistant     Filed:  12/20/17 09:26 AM Encounter Date:  12/20/2017 Status:  Signed     :  Lu Martinez CMA (Certified Medical Assistant)            If the provider orders any diagnostic testing at today's visit, the patient would prefer to be contacted at[NH1.1T] cell[NH1.1M].  If by phone their[NH1.1T]  cell[NH1.1M]preferred phone number is[NH1.1T] [phone][NH1.1M].  Is it okay to leave a detailed message on their voicemail or with a family member?[NH1.1T]  Yes[NH1.1M]  Patient is aware that if they are my chart active most of their test results will be auto released once the provider has viewed them.[NH1.1T]            Revision History        User Key Date/Time User Provider Type Action    > NH1.1 12/20/17 09:26 AM Lu Martinez CMA Certified Medical Assistant Sign    M - Manual, T - Template            
[FreeTextEntry1] : followup for dm2 management. \par

## 2021-01-04 ENCOUNTER — RX RENEWAL (OUTPATIENT)
Age: 61
End: 2021-01-04

## 2021-02-02 ENCOUNTER — APPOINTMENT (OUTPATIENT)
Dept: ENDOCRINOLOGY | Facility: CLINIC | Age: 61
End: 2021-02-02

## 2021-03-03 ENCOUNTER — TRANSCRIPTION ENCOUNTER (OUTPATIENT)
Age: 61
End: 2021-03-03

## 2021-03-24 ENCOUNTER — APPOINTMENT (OUTPATIENT)
Dept: INTERNAL MEDICINE | Facility: CLINIC | Age: 61
End: 2021-03-24
Payer: COMMERCIAL

## 2021-03-24 ENCOUNTER — LABORATORY RESULT (OUTPATIENT)
Age: 61
End: 2021-03-24

## 2021-03-24 VITALS
HEART RATE: 126 BPM | DIASTOLIC BLOOD PRESSURE: 80 MMHG | BODY MASS INDEX: 44.58 KG/M2 | OXYGEN SATURATION: 98 % | TEMPERATURE: 97.9 F | RESPIRATION RATE: 18 BRPM | HEIGHT: 69 IN | WEIGHT: 301 LBS | SYSTOLIC BLOOD PRESSURE: 152 MMHG

## 2021-03-24 DIAGNOSIS — Z87.891 PERSONAL HISTORY OF NICOTINE DEPENDENCE: ICD-10-CM

## 2021-03-24 DIAGNOSIS — Z81.8 FAMILY HISTORY OF OTHER MENTAL AND BEHAVIORAL DISORDERS: ICD-10-CM

## 2021-03-24 DIAGNOSIS — Z82.5 FAMILY HISTORY OF ASTHMA AND OTHER CHRONIC LOWER RESPIRATORY DISEASES: ICD-10-CM

## 2021-03-24 PROCEDURE — 99386 PREV VISIT NEW AGE 40-64: CPT | Mod: 25

## 2021-03-24 PROCEDURE — 99072 ADDL SUPL MATRL&STAF TM PHE: CPT

## 2021-03-24 PROCEDURE — 36415 COLL VENOUS BLD VENIPUNCTURE: CPT

## 2021-03-24 RX ORDER — CLOPIDOGREL BISULFATE 75 MG/1
75 TABLET, FILM COATED ORAL
Refills: 0 | Status: DISCONTINUED | COMMUNITY
End: 2021-03-24

## 2021-03-24 RX ORDER — METFORMIN HYDROCHLORIDE 1000 MG/1
1000 TABLET, FILM COATED, EXTENDED RELEASE ORAL
Qty: 180 | Refills: 1 | Status: DISCONTINUED | COMMUNITY
End: 2021-03-24

## 2021-03-24 RX ORDER — CARVEDILOL 12.5 MG/1
12.5 TABLET, FILM COATED ORAL
Refills: 0 | Status: DISCONTINUED | COMMUNITY
End: 2021-03-24

## 2021-03-24 RX ORDER — CETIRIZINE HYDROCHLORIDE 10 MG/1
10 TABLET, COATED ORAL
Refills: 0 | Status: DISCONTINUED | COMMUNITY
End: 2021-03-24

## 2021-03-24 RX ORDER — ERGOCALCIFEROL 1.25 MG/1
1.25 MG CAPSULE ORAL
Refills: 0 | Status: DISCONTINUED | COMMUNITY
End: 2021-03-24

## 2021-03-24 RX ORDER — RAMIPRIL 5 MG/1
5 CAPSULE ORAL
Refills: 0 | Status: DISCONTINUED | COMMUNITY
End: 2021-03-24

## 2021-03-24 RX ORDER — IBUPROFEN 200 MG/1
200 CAPSULE, LIQUID FILLED ORAL
Refills: 0 | Status: DISCONTINUED | COMMUNITY
End: 2021-03-24

## 2021-03-24 RX ORDER — LEVOTHYROXINE SODIUM 0.05 MG/1
50 TABLET ORAL
Refills: 0 | Status: DISCONTINUED | COMMUNITY
End: 2021-03-24

## 2021-03-24 RX ORDER — FLUTICASONE FUROATE AND VILANTEROL TRIFENATATE 100; 25 UG/1; UG/1
100-25 POWDER RESPIRATORY (INHALATION)
Refills: 0 | Status: DISCONTINUED | COMMUNITY
End: 2021-03-24

## 2021-03-24 RX ORDER — SPIRONOLACTONE 25 MG/1
25 TABLET ORAL
Refills: 0 | Status: DISCONTINUED | COMMUNITY
End: 2021-03-24

## 2021-03-25 LAB
CREAT SPEC-SCNC: 184 MG/DL
MICROALBUMIN 24H UR DL<=1MG/L-MCNC: 1.5 MG/DL
MICROALBUMIN/CREAT 24H UR-RTO: 8 MG/G

## 2021-03-25 NOTE — ASSESSMENT
[FreeTextEntry1] : -PMH: HTN, HLD, CAD (s/p Stent X 1; 2019), T2DM, BPH, H/o Nephrolithiasis\par -SH: . 1 children. . Former smoker (Quit 2009). Occasional EtOH use. \par \par ERICK is a 60 year M whom is here today to establish care w/ a new PMD & Annual well visit\par \par Specialists Involved:\par -Prior PMD: Dr. Oziel Robison ([phone])\par -Optho: Dr. Vega ([phone])\par -Endo: Dr. Bianchi \par -Cardio: Dr. Yoder\par -Pulm: Dr. Mercado #?\par -Uro: Dr. Roche #?\par -GI:  ??\par -Derm: ??\par \par -F/u labs drawn in office today\par -Further recs pending lab results\par -F/u Immunization records from prior PMD\par -F/u Optho Records\par -F/u Cardio Records\par -RTO 6mo for routine f/u or sooner if needed

## 2021-03-25 NOTE — HISTORY OF PRESENT ILLNESS
[FreeTextEntry1] : HTN\par T2DM\par Establish care  [de-identified] : -PMH: HTN, HLD, CAD (s/p Stent X 1; 2019), T2DM, BPH, H/o Nephrolithiasis\par -SH: . 1 children. . Former smoker (Quit 2009). Occasional EtOH use. \par \par ERICK is a 60 year M whom is here today for f/u CAD & HTN and to establish care w/ a new PMD & Annual well visit\par Today, pt reports having back pain and has increasing weight and plan is for gastric sleeve in near future\par He denies any changes since our last f/u visit\par \par Specialists Involved:\par -Prior PMD: Dr. Oziel Robison ([phone])\par -Endo: Dr. Bianchi \par -Cardio: Dr. Yoder\par -GI:  ?\par -Optho:  ?\par -Pulm:  ?\par \par -Vaccines: Needs PPSV 23, Flu, Shingles, TDap\par -Colonoscopy: 10/2020\par -FH of Prostate, Colon, breast or ovarian CA: None known\par \par -HTN, HLD, CAD: S/p Stent X 1; 2019. Remains on Entresto 49-51mg BID, Metoprolol Suc 25mg QD, Furosemide 20mg QD, Aspirin 81mg qd & Atorvastatin 40mg HS. Follows w/ Cardio. No reported changes.\par -T2DM: Remains on He is non-compliant with Metformin 1000mg BID despite recs. Levemir 60u HS & Novolog 45u TIDAC. On Aspirin & Statin. Follows w/ Endo. No reported changes.\par -BPH: Remains on Alfluzosin

## 2021-03-25 NOTE — HEALTH RISK ASSESSMENT
[Very Good] : ~his/her~  mood as very good [Yes] : Yes [Monthly or less (1 pt)] : Monthly or less (1 point) [1 or 2 (0 pts)] : 1 or 2 (0 points) [Never (0 pts)] : Never (0 points) [No] : In the past 12 months have you used drugs other than those required for medical reasons? No [No falls in past year] : Patient reported no falls in the past year [0] : 2) Feeling down, depressed, or hopeless: Not at all (0) [Patient reported colonoscopy was normal] : Patient reported colonoscopy was normal [Alone] : lives alone [30 or more] : 30 or more [HIV test declined] : HIV test declined [Hepatitis C test declined] : Hepatitis C test declined [Employed] : employed [] :  [# Of Children ___] : has [unfilled] children [Fully functional (bathing, dressing, toileting, transferring, walking, feeding)] : Fully functional (bathing, dressing, toileting, transferring, walking, feeding) [Fully functional (using the telephone, shopping, preparing meals, housekeeping, doing laundry, using] : Fully functional and needs no help or supervision to perform IADLs (using the telephone, shopping, preparing meals, housekeeping, doing laundry, using transportation, managing medications and managing finances) [Reviewed no changes] : Reviewed no changes [] : No [YearQuit] : 2009 [Audit-CScore] : 1 [ZAQ1Ypctd] : 0 [Change in mental status noted] : No change in mental status noted [ColonoscopyDate] : 10/20 [AdvancecareDate] : 03/21

## 2021-03-26 LAB
ALBUMIN SERPL ELPH-MCNC: 4.2 G/DL
ALP BLD-CCNC: 73 U/L
ALT SERPL-CCNC: 39 U/L
ANION GAP SERPL CALC-SCNC: 14 MMOL/L
AST SERPL-CCNC: 28 U/L
BASOPHILS # BLD AUTO: 0.05 K/UL
BASOPHILS NFR BLD AUTO: 0.4 %
BILIRUB SERPL-MCNC: 0.3 MG/DL
BUN SERPL-MCNC: 16 MG/DL
CALCIUM SERPL-MCNC: 9.7 MG/DL
CHLORIDE SERPL-SCNC: 102 MMOL/L
CHOLEST SERPL-MCNC: 237 MG/DL
CO2 SERPL-SCNC: 23 MMOL/L
CREAT SERPL-MCNC: 0.87 MG/DL
EOSINOPHIL # BLD AUTO: 0.32 K/UL
EOSINOPHIL NFR BLD AUTO: 2.8 %
ESTIMATED AVERAGE GLUCOSE: 214 MG/DL
GLUCOSE SERPL-MCNC: 239 MG/DL
HBA1C MFR BLD HPLC: 9.1 %
HCT VFR BLD CALC: 43 %
HDLC SERPL-MCNC: 41 MG/DL
HGB BLD-MCNC: 14.1 G/DL
IMM GRANULOCYTES NFR BLD AUTO: 0.6 %
LDLC SERPL CALC-MCNC: 120 MG/DL
LYMPHOCYTES # BLD AUTO: 2.13 K/UL
LYMPHOCYTES NFR BLD AUTO: 19 %
MAN DIFF?: NORMAL
MCHC RBC-ENTMCNC: 31 PG
MCHC RBC-ENTMCNC: 32.8 GM/DL
MCV RBC AUTO: 94.5 FL
MONOCYTES # BLD AUTO: 0.96 K/UL
MONOCYTES NFR BLD AUTO: 8.5 %
NEUTROPHILS # BLD AUTO: 7.7 K/UL
NEUTROPHILS NFR BLD AUTO: 68.7 %
NONHDLC SERPL-MCNC: 196 MG/DL
PLATELET # BLD AUTO: 342 K/UL
POTASSIUM SERPL-SCNC: 4.3 MMOL/L
PROT SERPL-MCNC: 7 G/DL
PSA SERPL-MCNC: 0.73 NG/ML
RBC # BLD: 4.55 M/UL
RBC # FLD: 12.9 %
SODIUM SERPL-SCNC: 138 MMOL/L
TRIGL SERPL-MCNC: 381 MG/DL
WBC # FLD AUTO: 11.23 K/UL

## 2021-04-17 ENCOUNTER — RESULT REVIEW (OUTPATIENT)
Age: 61
End: 2021-04-17

## 2021-04-21 ENCOUNTER — APPOINTMENT (OUTPATIENT)
Dept: ENDOCRINOLOGY | Facility: CLINIC | Age: 61
End: 2021-04-21
Payer: COMMERCIAL

## 2021-04-21 VITALS
DIASTOLIC BLOOD PRESSURE: 90 MMHG | SYSTOLIC BLOOD PRESSURE: 140 MMHG | HEIGHT: 69 IN | BODY MASS INDEX: 45.77 KG/M2 | WEIGHT: 309 LBS

## 2021-04-21 PROCEDURE — 99214 OFFICE O/P EST MOD 30 MIN: CPT

## 2021-04-21 PROCEDURE — 99072 ADDL SUPL MATRL&STAF TM PHE: CPT

## 2021-04-21 NOTE — ASSESSMENT
[FreeTextEntry1] : T2DM\par -At this time, pt is not cleared for surgery however we are going to download his DEXCOM virtually in two weeks. Suspect improvement in blood sugars at this time because he will be starting the gastric sleeve pre op diet.\par -Continue insulin regimen for now but suspect he may need less insulin with  gastric sleeve pre op diet, call office if blood sugars trend less than 100 for dose decrease\par \par \par HTN\par -Continue BP regimen as per cardiology \par \par \par HLD\par -Continue increased statin dose\par \par \par RTO Post operatively with Dr. Bianchi

## 2021-04-21 NOTE — HISTORY OF PRESENT ILLNESS
[FreeTextEntry1] : Pt needs clearance for gastric sleeve, plan for 5/24/2021.\par Slowly starting the diet for sleeve.\par \par T2DM\par Severity: uncontrolled\par Duration: since 2012\par Modifying Factors: on insulin\par \par SMBG\par Uses DEXCOM (did not have clarity shannan downloaded prior to today visit)\par \par Current drug regimen\par Levemir 80 units QHS\par Humalog 60 units TID with meals\par \par HGA1C 9.1-4/2021\par \par Eye exam: 1/2021- denies DR\par Foot exam: Sees podiatry every 3 months\par Kidney disease: denies\par Heart disease: needs cardiac clearance\par \par Weight: plans for gastric sleeve\par Diet: starting the strict sleeve diet tomorrow \par Exercise: cannot exercise due to knee pain\par Smoking : denies\par \par HLD\par Triglyceride 381, Total cholesterol 237, HDL 41, \par Atorvastatin 80 mg daily- recent increase\par \par HTN\par BP in office 140/90\par Metoprolol 25 mg daily\par Furosemide 20 mg daily\par

## 2021-04-21 NOTE — REVIEW OF SYSTEMS
[Polyuria] : polyuria [Nocturia] : nocturia [Fatigue] : no fatigue [Recent Weight Gain (___ Lbs)] : no recent weight gain [Recent Weight Loss (___ Lbs)] : no recent weight loss [Visual Field Defect] : no visual field defect [Blurred Vision] : no blurred vision [Dysphagia] : no dysphagia [Neck Pain] : no neck pain [Dysphonia] : no dysphonia [Chest Pain] : no chest pain [Shortness Of Breath] : no shortness of breath [Constipation] : no constipation [Diarrhea] : no diarrhea [Polydipsia] : no polydipsia

## 2021-05-17 LAB
ALBUMIN SERPL ELPH-MCNC: 4.5 G/DL
ALP BLD-CCNC: 64 U/L
ALT SERPL-CCNC: 29 U/L
ANION GAP SERPL CALC-SCNC: 13 MMOL/L
AST SERPL-CCNC: 21 U/L
BASOPHILS # BLD AUTO: 0.06 K/UL
BASOPHILS NFR BLD AUTO: 0.9 %
BILIRUB SERPL-MCNC: 0.5 MG/DL
BUN SERPL-MCNC: 20 MG/DL
CALCIUM SERPL-MCNC: 9.3 MG/DL
CHLORIDE SERPL-SCNC: 106 MMOL/L
CHOLEST SERPL-MCNC: 148 MG/DL
CO2 SERPL-SCNC: 23 MMOL/L
CREAT SERPL-MCNC: 0.82 MG/DL
CREAT SPEC-SCNC: 117 MG/DL
EOSINOPHIL # BLD AUTO: 0.43 K/UL
EOSINOPHIL NFR BLD AUTO: 6.2 %
ESTIMATED AVERAGE GLUCOSE: 186 MG/DL
GLUCOSE SERPL-MCNC: 177 MG/DL
HBA1C MFR BLD HPLC: 8.1 %
HCT VFR BLD CALC: 47.1 %
HDLC SERPL-MCNC: 38 MG/DL
HGB BLD-MCNC: 14.3 G/DL
IMM GRANULOCYTES NFR BLD AUTO: 0.4 %
LDLC SERPL CALC-MCNC: 85 MG/DL
LDLC SERPL DIRECT ASSAY-MCNC: 93 MG/DL
LYMPHOCYTES # BLD AUTO: 1.95 K/UL
LYMPHOCYTES NFR BLD AUTO: 28.2 %
MAN DIFF?: NORMAL
MCHC RBC-ENTMCNC: 30.2 PG
MCHC RBC-ENTMCNC: 30.4 GM/DL
MCV RBC AUTO: 99.4 FL
MICROALBUMIN 24H UR DL<=1MG/L-MCNC: <1.2 MG/DL
MICROALBUMIN/CREAT 24H UR-RTO: NORMAL MG/G
MONOCYTES # BLD AUTO: 0.71 K/UL
MONOCYTES NFR BLD AUTO: 10.3 %
NEUTROPHILS # BLD AUTO: 3.74 K/UL
NEUTROPHILS NFR BLD AUTO: 54 %
NONHDLC SERPL-MCNC: 109 MG/DL
PLATELET # BLD AUTO: 327 K/UL
POTASSIUM SERPL-SCNC: 4.7 MMOL/L
PROT SERPL-MCNC: 7 G/DL
RBC # BLD: 4.74 M/UL
RBC # FLD: 12.8 %
SODIUM SERPL-SCNC: 142 MMOL/L
TRIGL SERPL-MCNC: 121 MG/DL
WBC # FLD AUTO: 6.92 K/UL

## 2021-05-19 ENCOUNTER — NON-APPOINTMENT (OUTPATIENT)
Age: 61
End: 2021-05-19

## 2021-05-24 ENCOUNTER — RESULT REVIEW (OUTPATIENT)
Age: 61
End: 2021-05-24

## 2021-06-24 ENCOUNTER — APPOINTMENT (OUTPATIENT)
Dept: ENDOCRINOLOGY | Facility: CLINIC | Age: 61
End: 2021-06-24
Payer: COMMERCIAL

## 2021-06-24 VITALS — HEART RATE: 72 BPM | DIASTOLIC BLOOD PRESSURE: 90 MMHG | HEIGHT: 69 IN | SYSTOLIC BLOOD PRESSURE: 140 MMHG

## 2021-06-24 VITALS — BODY MASS INDEX: 38.84 KG/M2 | WEIGHT: 263 LBS

## 2021-06-24 PROCEDURE — 99072 ADDL SUPL MATRL&STAF TM PHE: CPT

## 2021-06-24 PROCEDURE — 99214 OFFICE O/P EST MOD 30 MIN: CPT

## 2021-06-24 NOTE — ASSESSMENT
[Carbohydrate Consistent Diet] : carbohydrate consistent diet [Exercise/Effect on Glucose] : exercise/effect on glucose [Self Monitoring of Blood Glucose] : self monitoring of blood glucose [Weight Loss] : weight loss [FreeTextEntry1] : DM2 moderately uncontrolled in patient with  HTN. HLD and morbid obesity. \par He had sleeve surgery May 2021 and he lost 45 lbs. A1c 8.1. He had to stop basal insulin and he takes meal insulin prn. \par \par restart MF 1000 mg BID \par stop all insulins. \par encouraged more exercise walking 30 min 3 x week\par Importance of blood glucose monitoring discussed. Targets reviewed. Recommended pt try to keep blood glucose level below 130 (110) before meals and below 160 (140) two hours after meals.\par eye exam referral \par bgs 4 x day\par continue ACEI \par got covid vaccine \par \par HTN:   bp at target on meds. Continue current management.\par I advised low fat/low cholesterol diet, low salt diet, and weight loss\par \par HLD:   CAD s/ p MI . LDl excellent. continue statin \par low fat/low cholesterol diet and weight loss advised\par  \par obesity: advsied to continue walking so he can loose some more weight. \par he needs to start exercising and walking but he declines. he says he works at work \par

## 2021-09-22 ENCOUNTER — APPOINTMENT (OUTPATIENT)
Dept: INTERNAL MEDICINE | Facility: CLINIC | Age: 61
End: 2021-09-22
Payer: COMMERCIAL

## 2021-09-22 VITALS
HEART RATE: 86 BPM | BODY MASS INDEX: 34.66 KG/M2 | HEIGHT: 69 IN | WEIGHT: 234 LBS | DIASTOLIC BLOOD PRESSURE: 80 MMHG | SYSTOLIC BLOOD PRESSURE: 128 MMHG | TEMPERATURE: 97.3 F | OXYGEN SATURATION: 96 % | RESPIRATION RATE: 14 BRPM

## 2021-09-22 DIAGNOSIS — Z23 ENCOUNTER FOR IMMUNIZATION: ICD-10-CM

## 2021-09-22 DIAGNOSIS — Z79.4 LONG TERM (CURRENT) USE OF INSULIN: ICD-10-CM

## 2021-09-22 DIAGNOSIS — Z86.2 PERSONAL HISTORY OF DISEASES OF THE BLOOD AND BLOOD-FORMING ORGANS AND CERTAIN DISORDERS INVOLVING THE IMMUNE MECHANISM: ICD-10-CM

## 2021-09-22 DIAGNOSIS — Z87.898 PERSONAL HISTORY OF OTHER SPECIFIED CONDITIONS: ICD-10-CM

## 2021-09-22 PROCEDURE — 99214 OFFICE O/P EST MOD 30 MIN: CPT | Mod: 25

## 2021-09-22 PROCEDURE — 36415 COLL VENOUS BLD VENIPUNCTURE: CPT

## 2021-09-22 RX ORDER — BLOOD-GLUCOSE TRANSMITTER
EACH MISCELLANEOUS
Qty: 1 | Refills: 3 | Status: DISCONTINUED | COMMUNITY
Start: 2020-04-24 | End: 2021-09-22

## 2021-09-22 RX ORDER — BLOOD-GLUCOSE SENSOR
EACH MISCELLANEOUS
Qty: 3 | Refills: 3 | Status: DISCONTINUED | COMMUNITY
Start: 2020-04-24 | End: 2021-09-22

## 2021-09-22 RX ORDER — BLOOD-GLUCOSE,RECEIVER,CONT
EACH MISCELLANEOUS
Qty: 1 | Refills: 0 | Status: DISCONTINUED | COMMUNITY
Start: 2020-04-24 | End: 2021-09-22

## 2021-09-22 RX ORDER — INSULIN DETEMIR 100 [IU]/ML
100 INJECTION, SOLUTION SUBCUTANEOUS
Qty: 4 | Refills: 0 | Status: DISCONTINUED | COMMUNITY
Start: 2019-05-14 | End: 2021-09-22

## 2021-09-22 RX ORDER — INSULIN ASPART 100 [IU]/ML
100 INJECTION, SOLUTION INTRAVENOUS; SUBCUTANEOUS
Qty: 8 | Refills: 0 | Status: DISCONTINUED | COMMUNITY
Start: 2019-02-14 | End: 2021-09-22

## 2021-09-22 NOTE — ASSESSMENT
[FreeTextEntry1] : -PMH: T2DM, HTN, HLD, CAD (s/p Stent X 1; 2019), COPD, BPH, H/o Nephrolithiasis\par -SH: . 1 children. . Former smoker (Quit 2009). Occasional EtOH use. \par \par ERICK is a 60 year M whom is here today for f/u HTN, HLD\par \par Specialists Involved:\par -Prior PMD: Dr. Oziel Robison ([phone])\par -Optho: Dr. Vega ([phone])\par -Endo: Dr. Bianchi \par -Cardio: Dr. Yoder\par -Pulm: Dr. Mercado ([phone]\par -Uro: Dr. Roche #?\par -GI:  ??\par \par -F/u labs drawn in office today\par -Further recs pending lab results\par -F/u immunization records from prior PMD\par -Continue f/u w/ Cardio (CAD)\par -Continue f/u w/ Pulm (CPOD & SAROJ. No longer needs to wear CPAP 2/2 weight loss)\par -Continue f/u w/ Endo (T2DM)\par -RTO 6mo for CPE or sooner if needed

## 2021-09-22 NOTE — COUNSELING
[Potential consequences of obesity discussed] : Potential consequences of obesity discussed [Benefits of weight loss discussed] : Benefits of weight loss discussed [Structured Weight Management Program suggested:] : Structured weight management program suggested [Encouraged to maintain food diary] : Encouraged to maintain food diary [Encouraged to increase physical activity] : Encouraged to increase physical activity [Encouraged to use exercise tracking device] : Encouraged to use exercise tracking device [Good understanding] : Patient has a good understanding of disease, goals and obesity follow-up plan [FreeTextEntry2] : s/p gastric bypass surgery  functional limitations in following categories/impairments found/therapy frequency/predicted duration of therapy intervention/anticipated equipment needs at discharge/anticipated discharge recommendation

## 2021-09-22 NOTE — HISTORY OF PRESENT ILLNESS
[FreeTextEntry1] : f/u HTN, HLD [de-identified] : -PMH: HTN, HLD, CAD (s/p Stent X 1; 2019), T2DM, BPH, H/o Nephrolithiasis, S/p Gastric Bypass (5/2021)\par -SH: . 1 children. . Former smoker (Quit 2009). Occasional EtOH use. \par \par ERICK is a 60 year M whom is here today for f/u HTN, HLD\par Today, pt reports feeling well and is w/o complaints\par He denies any changes since our last f/u visit\par \par -HTN, HLD, CAD: S/p Stent X 1; 2019. Remains on Entresto 49-51mg BID, Metoprolol Suc 25mg QD, Furosemide 20mg QD. On  Aspirin 81mg qd. No longer on Atorvastatin as per his Cardiologist given weight loss. Follows w/ Cardio. No reported changes.\par \par -T2DM: Now only on Metformin 500mg QOD. On Aspirin. Follows w/ Endo. No reported changes.\par -BPH: Remains on Alfluzosin

## 2021-09-23 LAB
CHOLEST SERPL-MCNC: 246 MG/DL
ESTIMATED AVERAGE GLUCOSE: 180 MG/DL
HBA1C MFR BLD HPLC: 7.9 %
HDLC SERPL-MCNC: 41 MG/DL
LDLC SERPL CALC-MCNC: 159 MG/DL
NONHDLC SERPL-MCNC: 205 MG/DL
TRIGL SERPL-MCNC: 229 MG/DL

## 2021-10-25 ENCOUNTER — APPOINTMENT (OUTPATIENT)
Dept: ENDOCRINOLOGY | Facility: CLINIC | Age: 61
End: 2021-10-25

## 2022-01-24 ENCOUNTER — NON-APPOINTMENT (OUTPATIENT)
Age: 62
End: 2022-01-24

## 2022-02-08 ENCOUNTER — APPOINTMENT (OUTPATIENT)
Dept: INTERNAL MEDICINE | Facility: CLINIC | Age: 62
End: 2022-02-08

## 2022-02-08 ENCOUNTER — NON-APPOINTMENT (OUTPATIENT)
Age: 62
End: 2022-02-08

## 2022-03-29 ENCOUNTER — NON-APPOINTMENT (OUTPATIENT)
Age: 62
End: 2022-03-29

## 2022-03-29 ENCOUNTER — APPOINTMENT (OUTPATIENT)
Dept: INTERNAL MEDICINE | Facility: CLINIC | Age: 62
End: 2022-03-29

## 2022-03-29 DIAGNOSIS — N40.0 BENIGN PROSTATIC HYPERPLASIA WITHOUT LOWER URINARY TRACT SYMPMS: ICD-10-CM

## 2022-03-30 ENCOUNTER — NON-APPOINTMENT (OUTPATIENT)
Age: 62
End: 2022-03-30

## 2022-03-30 NOTE — HISTORY OF PRESENT ILLNESS
[FreeTextEntry1] : annual well visit [de-identified] : -PMH: T2DM, HTN, HLD, CHF, CAD (s/p VLADIMIR X 1; 2019), H/o CVA (1/2022), COPD, BPH, H/o Nephrolithiasis\par -SH: . 1 children. . Former smoker (Quit 2009). Occasional EtOH use. \par \par ERICK is a 61 year M whom is here today for Annual well visit\par Today, pt reports feeling well \par \par Pt admitted to [facility] 1/18/22 & DCd on 1/20/22\par Pt admitted for COVID-19 & CVA \par Neuro & Cardio were consulted\par DC lab resultsw WNL\par CT Brain, MR Brain scanned into chart. \par \par DC Med list:\par Atorvastatin 40mg HS\par ASA 81mg QD\par Metformin. \par \par Specialists Involved:\par -Prior PMD: Dr. Oziel Robison ([phone])\par -Optho: Dr. Vega ([phone])\par -Endo: Dr. Jenniffer Bianchi \par -Cardio: Dr. Perez Yoder\par -Pulm: Dr. Mercado ([phone]\par -Uro: Dr. Roche #?\par -GI:  ??\par \par -Vaccines: Needs PPSV 23, Shingles (Declines)\par -Colonoscopy: 10/2020 ??\par -Lung CA Screening: ?\par -FH of Prostate, Colon, breast or ovarian CA: None known\par \par -HTN, HLD, CAD: S/p Stent X 1; 2019. Remains on Entresto 49-51mg BID, Metoprolol Suc 25mg QD, Furosemide 20mg QD. On Aspirin 81mg qd. No longer on Atorvastatin as per his Cardiologist given weight loss. Follows w/ Cardio. No reported changes.\par -H/o CVA (1/2022):\par \par -T2DM: Now only on Metformin 500mg QOD. On Aspirin. Follows w/ Endo. No reported changes.\par -BPH: Remains on Alfluzosin

## 2022-03-30 NOTE — HEALTH RISK ASSESSMENT
[YearQuit] : 2009 [Audit-CScore] : 1 [KSQ3Xvlws] : 0 [Change in mental status noted] : No change in mental status noted [ColonoscopyDate] : 10/20 [AdvancecareDate] : 03/22

## 2022-03-30 NOTE — ASSESSMENT
[FreeTextEntry1] : -PMH: T2DM, HTN, HLD, CHF, CAD (s/p VLADIMIR X 1; 2019), H/o CVA (1/2022), COPD, BPH, H/o Nephrolithiasis\par -SH: . 1 children. . Former smoker (Quit 2009). Occasional EtOH use. \par \par ERICK is a 61 year M whom is here today for Annual well visit\par \par Specialists Involved:\par -Optho: Dr. Vega ([phone])\par -Endo: Dr. Jenniffer Bianchi \par -Cardio: Dr. Perez Yoder\par -Pulm: Dr. Mercado (106\par -Uro: Dr. Roche #?\par -GI:  ??\par \par -F/u labs drawn in office today\par -Further recs pending lab results\par -F/u Immunization records from prior PMD\par -Continue f/u w/ Endo (DM)\par -Continue f/u w/ Optho (DM)\par -Continue f/u w/ Cardio (CAD)\par -Continue f/u w/ Pulm (COPD)\par -RTO 6mo for routine f/u or sooner if needed

## 2022-04-21 ENCOUNTER — RX RENEWAL (OUTPATIENT)
Age: 62
End: 2022-04-21

## 2022-04-29 ENCOUNTER — OUTPATIENT (OUTPATIENT)
Dept: OUTPATIENT SERVICES | Facility: HOSPITAL | Age: 62
LOS: 1 days | End: 2022-04-29
Payer: COMMERCIAL

## 2022-04-29 VITALS
DIASTOLIC BLOOD PRESSURE: 72 MMHG | WEIGHT: 207.01 LBS | RESPIRATION RATE: 16 BRPM | SYSTOLIC BLOOD PRESSURE: 124 MMHG | OXYGEN SATURATION: 98 % | TEMPERATURE: 97 F | HEART RATE: 64 BPM | HEIGHT: 66.5 IN

## 2022-04-29 DIAGNOSIS — N20.0 CALCULUS OF KIDNEY: ICD-10-CM

## 2022-04-29 DIAGNOSIS — Z98.84 BARIATRIC SURGERY STATUS: Chronic | ICD-10-CM

## 2022-04-29 DIAGNOSIS — G47.33 OBSTRUCTIVE SLEEP APNEA (ADULT) (PEDIATRIC): ICD-10-CM

## 2022-04-29 DIAGNOSIS — Z95.5 PRESENCE OF CORONARY ANGIOPLASTY IMPLANT AND GRAFT: Chronic | ICD-10-CM

## 2022-04-29 DIAGNOSIS — E11.9 TYPE 2 DIABETES MELLITUS WITHOUT COMPLICATIONS: ICD-10-CM

## 2022-04-29 DIAGNOSIS — Z01.812 ENCOUNTER FOR PREPROCEDURAL LABORATORY EXAMINATION: ICD-10-CM

## 2022-04-29 DIAGNOSIS — I25.10 ATHEROSCLEROTIC HEART DISEASE OF NATIVE CORONARY ARTERY WITHOUT ANGINA PECTORIS: ICD-10-CM

## 2022-04-29 DIAGNOSIS — N40.0 BENIGN PROSTATIC HYPERPLASIA WITHOUT LOWER URINARY TRACT SYMPTOMS: ICD-10-CM

## 2022-04-29 LAB
A1C WITH ESTIMATED AVERAGE GLUCOSE RESULT: 7.7 % — HIGH (ref 4–5.6)
ANION GAP SERPL CALC-SCNC: 13 MMOL/L — SIGNIFICANT CHANGE UP (ref 7–14)
APPEARANCE UR: CLEAR — SIGNIFICANT CHANGE UP
BILIRUB UR-MCNC: NEGATIVE — SIGNIFICANT CHANGE UP
BLD GP AB SCN SERPL QL: NEGATIVE — SIGNIFICANT CHANGE UP
BUN SERPL-MCNC: 10 MG/DL — SIGNIFICANT CHANGE UP (ref 7–23)
CALCIUM SERPL-MCNC: 9.2 MG/DL — SIGNIFICANT CHANGE UP (ref 8.4–10.5)
CHLORIDE SERPL-SCNC: 103 MMOL/L — SIGNIFICANT CHANGE UP (ref 98–107)
CO2 SERPL-SCNC: 24 MMOL/L — SIGNIFICANT CHANGE UP (ref 22–31)
COLOR SPEC: YELLOW — SIGNIFICANT CHANGE UP
CREAT SERPL-MCNC: 0.73 MG/DL — SIGNIFICANT CHANGE UP (ref 0.5–1.3)
DIFF PNL FLD: NEGATIVE — SIGNIFICANT CHANGE UP
EGFR: 103 ML/MIN/1.73M2 — SIGNIFICANT CHANGE UP
ESTIMATED AVERAGE GLUCOSE: 174 — SIGNIFICANT CHANGE UP
GLUCOSE SERPL-MCNC: 120 MG/DL — HIGH (ref 70–99)
GLUCOSE UR QL: ABNORMAL
HCT VFR BLD CALC: 40.5 % — SIGNIFICANT CHANGE UP (ref 39–50)
HGB BLD-MCNC: 13.4 G/DL — SIGNIFICANT CHANGE UP (ref 13–17)
KETONES UR-MCNC: NEGATIVE — SIGNIFICANT CHANGE UP
LEUKOCYTE ESTERASE UR-ACNC: NEGATIVE — SIGNIFICANT CHANGE UP
MCHC RBC-ENTMCNC: 31.5 PG — SIGNIFICANT CHANGE UP (ref 27–34)
MCHC RBC-ENTMCNC: 33.1 GM/DL — SIGNIFICANT CHANGE UP (ref 32–36)
MCV RBC AUTO: 95.3 FL — SIGNIFICANT CHANGE UP (ref 80–100)
NITRITE UR-MCNC: NEGATIVE — SIGNIFICANT CHANGE UP
NRBC # BLD: 0 /100 WBCS — SIGNIFICANT CHANGE UP
NRBC # FLD: 0 K/UL — SIGNIFICANT CHANGE UP
PH UR: 6.5 — SIGNIFICANT CHANGE UP (ref 5–8)
PLATELET # BLD AUTO: 281 K/UL — SIGNIFICANT CHANGE UP (ref 150–400)
POTASSIUM SERPL-MCNC: 4.3 MMOL/L — SIGNIFICANT CHANGE UP (ref 3.5–5.3)
POTASSIUM SERPL-SCNC: 4.3 MMOL/L — SIGNIFICANT CHANGE UP (ref 3.5–5.3)
PROT UR-MCNC: ABNORMAL
RBC # BLD: 4.25 M/UL — SIGNIFICANT CHANGE UP (ref 4.2–5.8)
RBC # FLD: 13.2 % — SIGNIFICANT CHANGE UP (ref 10.3–14.5)
RH IG SCN BLD-IMP: POSITIVE — SIGNIFICANT CHANGE UP
SODIUM SERPL-SCNC: 140 MMOL/L — SIGNIFICANT CHANGE UP (ref 135–145)
SP GR SPEC: 1.02 — SIGNIFICANT CHANGE UP (ref 1–1.05)
UROBILINOGEN FLD QL: SIGNIFICANT CHANGE UP
WBC # BLD: 8.9 K/UL — SIGNIFICANT CHANGE UP (ref 3.8–10.5)
WBC # FLD AUTO: 8.9 K/UL — SIGNIFICANT CHANGE UP (ref 3.8–10.5)

## 2022-04-29 PROCEDURE — 93010 ELECTROCARDIOGRAM REPORT: CPT

## 2022-04-29 NOTE — H&P PST ADULT - NSICDXPASTSURGICALHX_GEN_ALL_CORE_FT
PAST SURGICAL HISTORY:  H/O gastric bypass 5/24/2021    History of coronary angioplasty with insertion of stent

## 2022-04-29 NOTE — H&P PST ADULT - PROBLEM SELECTOR PLAN 1
Scheduled for calculus of kidney 5/12/22  Written & verbal preop instructions, gi prophylaxis & surgical soap given  Pt verbalized good understanding.  Teach back done on surgical soap instructions.

## 2022-04-29 NOTE — H&P PST ADULT - RX
CPAP, since gastric bypass - loss 100lbs  with the past yr.  No longer require CPAP.  Never retested

## 2022-04-29 NOTE — H&P PST ADULT - HISTORY OF PRESENT ILLNESS
60y/o male presents for preop eval for scheduled left percutaneous nephrolithotomy.  Pt with h/o  left flank pain.  Imaging done. Preop dx calculus of kidney.

## 2022-04-29 NOTE — H&P PST ADULT - ADMIT DATE
"ED Provider Note    Scribed for Barry Green M.D. by Otilia Butts. 2/15/2019, 4:50 PM.    Primary care provider: PCP Pt states none.   Means of arrival: Private vehicle  History obtained from: Patient  History limited by: None    CHIEF COMPLAINT  Chief Complaint   Patient presents with   • Pain       HPI  Flash Gallegos is a 44 y.o. male with a history of schizophrenia who presents to the Emergency Department for evaluation of neck pain onset prior to arrival. The patient arrived to the ED in C-collar after sustaining spinal injury for which he has already been seen. Patient is in ED for concerns about neck pain, and that he \"does not want to get addicted\" to his medication. Per family, patient does not want to be admitted or \"left behind in hospital.\" The patient is scheduled to return home after inclement weather subsides on 2/19/19, and requests prescription for pain management.     REVIEW OF SYSTEMS  Review of Systems   Musculoskeletal: Positive for neck pain.   Neurological: Loss of consciousness:        PAST MEDICAL HISTORY   Anxiety. Schizophrenia.     SURGICAL HISTORY  patient denies any surgical history    SOCIAL HISTORY  Social History   Substance Use Topics   • Smoking status: Heavy Tobacco Smoker     Packs/day: 1.00     Years: 12.00     Types: Cigarettes     Start date: 1/9/1985   • Smokeless tobacco: Never Used   • Alcohol use No      History   Drug Use No       FAMILY HISTORY  No family history noted.    CURRENT MEDICATIONS  No current facility-administered medications on file prior to encounter.      Current Outpatient Prescriptions on File Prior to Encounter   Medication Sig Dispense Refill   • clonazePAM (KLONOPIN) 0.5 MG Tab Take 1 Tab by mouth 3 times a day for 14 days. 42 Tab 0   • gabapentin (NEURONTIN) 300 MG Cap Take 1 Cap by mouth 3 times a day for 14 days. 42 Cap 0   • traZODone (DESYREL) 50 MG Tab Take 1 Tab by mouth every bedtime for 14 days. 14 Tab 0   • OLANZapine (ZYPREXA) " 15 MG tablet Take 1 Tab by mouth every evening for 14 days. 14 Tab 0         ALLERGIES  No Known Allergies    PHYSICAL EXAM  VITAL SIGNS: /85   Pulse (!) 111   Temp 36.4 °C (97.5 °F) (Temporal)   Resp 12   Wt 73 kg (161 lb)   SpO2 95%   BMI 25.99 kg/m²     Constitutional: Alert, crying.   HENT: Normocephalic, Bilateral external ears normal. Nose normal.   Eyes: Pupils are equal and reactive. Conjunctiva normal, non-icteric.   Thorax & Lungs: Easy unlabored respirations  Abdomen:  No gross signs of peritonitis, no pain with movement   Skin: Visualized skin is  Dry, No erythema, No rash. No wounds to back of neck.   Extremities:  No edema, No asymmetry.   Neurologic: Alert and oriented, Grossly non-focal. Arm abduction, and elbow flexion and extension are all strong.   Psychiatric: Affect and Mood normal      COURSE & MEDICAL DECISION MAKING  Nursing notes, VS, PMSFHx reviewed in chart.    4:50 PM - Patient seen and examined at bedside. The patient is in mild distress and is crying. Patient will be treated with morphine 10mg/mL injection 8mg, and Zofran 4mg.     4:56 PM - Reviewed past medical records.     4:58 PM - Patient was reevaluated at bedside. Arm abduction and elbow flexion/extension are all strong.     5:05 PM - The patient will be discharged home with plan as outlined below. He is understanding and agreeable to discharge.         I reviewed prescription monitoring program for patient's narcotic use before prescribing a scheduled drug.The patient will not drink alcohol nor drive with prescribed medications. The patient will return for new or worsening symptoms and is stable at the time of discharge.    In prescribing controlled substances to this patient, I certify that I have obtained and reviewed the medical history of Flash Gallegos. I have also made a good david effort to obtain applicable records from other providers who have treated the patient and records did not demonstrate any increased  risk of substance abuse that would prevent me from prescribing controlled substances.     I have conducted a physical exam and documented it. I have reviewed Mr. Gallegos’s prescription history as maintained by the [place] Prescription Monitoring Program.     I have assessed the patient’s risk for abuse, dependency, and addiction using the validated Opioid Risk Tool available at https://www.mdcalc.com/bhpgrz-ycpc-cybt-ort-narcotic-abuse.     Given the above, I believe the benefits of controlled substance therapy outweigh the risks. The reasons for prescribing controlled substances include non-narcotic, oral analgesic alternatives have been inadequate for pain control. Accordingly, I have discussed the risk and benefits, treatment plan, and alternative therapies with the patient.       DISPOSITION:  Patient will be discharged home in stable condition.    FOLLOW UP:  No follow-up provider specified.    OUTPATIENT MEDICATIONS:  New Prescriptions    HYDROCODONE-ACETAMINOPHEN (NORCO) 5-325 MG TAB PER TABLET    Take 1-2 Tabs by mouth every 6 hours as needed for up to 7 days.       FINAL IMPRESSION  1. Closed nondisplaced fracture of third cervical vertebra, unspecified fracture morphology, initial encounter ([facility])    2. Neck pain    3. Trauma          Otilia AMADOR (Tiffany), am scribing for, and in the presence of, Barry Green M.D..    Electronically signed by: Otilia Meneses), 2/15/2019    IBarry M.D. personally performed the services described in this documentation, as scribed by Otilia Butts in my presence, and it is both accurate and complete. E.     The note accurately reflects work and decisions made by me.  aBrry Green  2/15/2019  6:55 PM       29-Apr-2022

## 2022-04-29 NOTE — H&P PST ADULT - NSICDXPASTMEDICALHX_GEN_ALL_CORE_FT
PAST MEDICAL HISTORY:  BPH without urinary obstruction     Calculus of kidney     Dyslipidemia     MI (myocardial infarction) 4 yrs ago    Type 2 diabetes mellitus      PAST MEDICAL HISTORY:  BPH without urinary obstruction     Calculus of kidney     Dyslipidemia     MI (myocardial infarction) 4 yrs ago    Obesity     Obstructive sleep apnea     Type 2 diabetes mellitus

## 2022-04-29 NOTE — H&P PST ADULT - PROBLEM SELECTOR PLAN 2
H/o MI 4 yrs ago, h/o 1 coronary stent and on low dose aspirin  instructed to continue. H/o MI 4 yrs ago, h/o 1 coronary stent and on low dose aspirin  instructed to continue.   copy of echo report in chart, pending comparison Ekg and recent stress test report and copy of recent annual eval.  pt has appointment with PCP for medical eval will request copy

## 2022-05-01 LAB
CULTURE RESULTS: NO GROWTH — SIGNIFICANT CHANGE UP
SPECIMEN SOURCE: SIGNIFICANT CHANGE UP

## 2022-05-09 ENCOUNTER — APPOINTMENT (OUTPATIENT)
Dept: ENDOCRINOLOGY | Facility: CLINIC | Age: 62
End: 2022-05-09
Payer: COMMERCIAL

## 2022-05-09 VITALS
DIASTOLIC BLOOD PRESSURE: 72 MMHG | WEIGHT: 210 LBS | HEIGHT: 69 IN | SYSTOLIC BLOOD PRESSURE: 142 MMHG | BODY MASS INDEX: 31.1 KG/M2

## 2022-05-09 PROBLEM — I21.9 ACUTE MYOCARDIAL INFARCTION, UNSPECIFIED: Chronic | Status: ACTIVE | Noted: 2018-06-17

## 2022-05-09 PROBLEM — N40.0 BENIGN PROSTATIC HYPERPLASIA WITHOUT LOWER URINARY TRACT SYMPTOMS: Chronic | Status: ACTIVE | Noted: 2022-04-29

## 2022-05-09 PROBLEM — E11.9 TYPE 2 DIABETES MELLITUS WITHOUT COMPLICATIONS: Chronic | Status: ACTIVE | Noted: 2022-04-29

## 2022-05-09 PROBLEM — G47.33 OBSTRUCTIVE SLEEP APNEA (ADULT) (PEDIATRIC): Chronic | Status: ACTIVE | Noted: 2022-04-29

## 2022-05-09 PROBLEM — E78.5 HYPERLIPIDEMIA, UNSPECIFIED: Chronic | Status: ACTIVE | Noted: 2022-04-29

## 2022-05-09 PROBLEM — E66.9 OBESITY, UNSPECIFIED: Chronic | Status: ACTIVE | Noted: 2022-04-29

## 2022-05-09 PROBLEM — N20.0 CALCULUS OF KIDNEY: Chronic | Status: ACTIVE | Noted: 2022-04-29

## 2022-05-09 PROCEDURE — 99215 OFFICE O/P EST HI 40 MIN: CPT

## 2022-05-09 NOTE — HISTORY OF PRESENT ILLNESS
[FreeTextEntry1] : 61 y.o. Male with PMHx T2D (Dx 7 y.ago), CAD,s/p MI, Morbid obesity, s/p bariatric surgery in May 2021. Lost 100Lb, regained 10Lb \par Off insulin 7/2021. Since then on Metformin 500 mg BID. Last seen by Dr. Bianchi in 6/2021. Now presents for follow up for DM. Will be going urosurgery in 3 days for right renal stone. \par \par Currently on Metformin 500 mg BID\par \par Checks SMBG: once a month ~150\par POC today 280 ate lunch, "sugars and ice cream"\par Last A1C 7.9% (9/2021)\par Patient repots recent pre-op labs but they are unavailable.\par

## 2022-05-09 NOTE — PHYSICAL EXAM
[Alert] : alert [Obese] : obese [No Acute Distress] : no acute distress [EOMI] : extra ocular movement intact [PERRL] : pupils equal, round and reactive to light [Normal Outer Ear/Nose] : the ears and nose were normal in appearance [Normal Hearing] : hearing was normal [Thyroid Not Enlarged] : the thyroid was not enlarged [No Thyroid Nodules] : no palpable thyroid nodules [Normal S1, S2] : normal S1 and S2 [Normal Rate] : heart rate was normal [Regular Rhythm] : with a regular rhythm [No Edema] : no peripheral edema [Pedal Pulses Normal] : the pedal pulses are present [Normal Bowel Sounds] : normal bowel sounds [Not Distended] : not distended [Soft] : abdomen soft [Normal Supraclavicular Nodes] : no supraclavicular lymphadenopathy [Normal Anterior Cervical Nodes] : no anterior cervical lymphadenopathy [Normal Posterior Cervical Nodes] : no posterior cervical lymphadenopathy [Normal Gait] : normal gait [No Clubbing, Cyanosis] : no clubbing  or cyanosis of the fingernails [No Joint Swelling] : no joint swelling seen [No Tremors] : no tremors [Oriented x3] : oriented to person, place, and time [Normal Affect] : the affect was normal [Normal Insight/Judgement] : insight and judgment were intact [Normal Mood] : the mood was normal [Acanthosis Nigricans] : no acanthosis nigricans

## 2022-05-09 NOTE — ASSESSMENT
[FreeTextEntry1] : 61 y.o. Male with PMHx T2D (Dx 7 y.ago), CAD,s/p MI, Morbid obesity, s/p bariatric surgery in May 2021. Lost 100Lb, regained 10Lb \par Off insulin 7/2021. Since then on Metformin 500 mg BID. Last seen by Dr. Bianchi in 6/2021. Now presents for follow up for DM. Will be going urosurgery in 3 days for right renal stone. \par \par Currently on Metformin 500 mg BID\par \par Checks SMBG: once a month ~150\par POC today 280 ate lunch, "sugars and ice cream"\par Faxed Labs from urology reviewed. Serum \par \par #T2D, previously uncontrolled\par A1C 7.7% (4/29/22)\par Patient not compliant with FS and Diet but states his BG has been good and he is "cutting his Metformin" now prior to urology surgery. \par No recent blood work available.\par Patient will hold Metformin prior to surgery and will resume after.\par Will follow after surgery with repeated blood work\par Declines referral to CDE\par \par # Obesity\par S/p bariatric surgery\par Encouraged carb consistent diet\par Dietary and lifestyle modifications discussed\par Exercise 3 x week encorraged \par Patient declines referral to dietitian \par \par # HLD\par Encouraged low fat/cholesterol diet\par Continue Atorvastatin 80 mg daily (takes 40 mg BID) \par \par RTC in 1 month with repeated blood work \par \par

## 2022-05-11 ENCOUNTER — TRANSCRIPTION ENCOUNTER (OUTPATIENT)
Age: 62
End: 2022-05-11

## 2022-05-12 ENCOUNTER — TRANSCRIPTION ENCOUNTER (OUTPATIENT)
Age: 62
End: 2022-05-12

## 2022-05-12 ENCOUNTER — OUTPATIENT (OUTPATIENT)
Dept: OUTPATIENT SERVICES | Facility: HOSPITAL | Age: 62
LOS: 1 days | Discharge: HOME | End: 2022-05-12

## 2022-05-12 VITALS
OXYGEN SATURATION: 96 % | WEIGHT: 207.01 LBS | RESPIRATION RATE: 16 BRPM | TEMPERATURE: 98 F | HEART RATE: 69 BPM | HEIGHT: 66.5 IN | SYSTOLIC BLOOD PRESSURE: 108 MMHG | DIASTOLIC BLOOD PRESSURE: 69 MMHG

## 2022-05-12 VITALS
SYSTOLIC BLOOD PRESSURE: 146 MMHG | DIASTOLIC BLOOD PRESSURE: 87 MMHG | OXYGEN SATURATION: 97 % | HEART RATE: 63 BPM | RESPIRATION RATE: 16 BRPM

## 2022-05-12 DIAGNOSIS — Z95.5 PRESENCE OF CORONARY ANGIOPLASTY IMPLANT AND GRAFT: Chronic | ICD-10-CM

## 2022-05-12 DIAGNOSIS — N20.0 CALCULUS OF KIDNEY: ICD-10-CM

## 2022-05-12 DIAGNOSIS — Z98.84 BARIATRIC SURGERY STATUS: Chronic | ICD-10-CM

## 2022-05-12 DEVICE — SURGIFLO MATRIX WITH THROMBIN KIT: Type: IMPLANTABLE DEVICE | Status: FUNCTIONAL

## 2022-05-12 DEVICE — GUIDEWIRE SENSOR DUAL-FLEX NITINOL STRAIGHT .038" X 150CM: Type: IMPLANTABLE DEVICE | Status: FUNCTIONAL

## 2022-05-12 DEVICE — URETERAL CATH AXXCESS OPEN END 6FR 70CM: Type: IMPLANTABLE DEVICE | Status: FUNCTIONAL

## 2022-05-12 DEVICE — CATH 810 SET 8.5FR 88X30CM: Type: IMPLANTABLE DEVICE | Status: FUNCTIONAL

## 2022-05-12 DEVICE — SET PROBE CYBERWAND STERILE: Type: IMPLANTABLE DEVICE | Status: FUNCTIONAL

## 2022-05-12 DEVICE — NEPHROSTOMY BALLOON CATH NEPHROMAX 24FR 17CM PTFE: Type: IMPLANTABLE DEVICE | Status: FUNCTIONAL

## 2022-05-12 DEVICE — KIT URET PERFLX PLUS 6FRX26CM: Type: IMPLANTABLE DEVICE | Status: FUNCTIONAL

## 2022-05-12 RX ORDER — OXYCODONE HYDROCHLORIDE 5 MG/1
1 TABLET ORAL
Qty: 16 | Refills: 0
Start: 2022-05-12 | End: 2022-05-15

## 2022-05-12 RX ORDER — ONDANSETRON 8 MG/1
4 TABLET, FILM COATED ORAL ONCE
Refills: 0 | Status: COMPLETED | OUTPATIENT
Start: 2022-05-12 | End: 2022-05-12

## 2022-05-12 RX ORDER — ATORVASTATIN CALCIUM 80 MG/1
1 TABLET, FILM COATED ORAL
Qty: 0 | Refills: 0 | DISCHARGE

## 2022-05-12 RX ORDER — TAMSULOSIN HYDROCHLORIDE 0.4 MG/1
0.4 CAPSULE ORAL ONCE
Refills: 0 | Status: COMPLETED | OUTPATIENT
Start: 2022-05-12 | End: 2022-05-12

## 2022-05-12 RX ORDER — HYDROMORPHONE HYDROCHLORIDE 2 MG/ML
0.5 INJECTION INTRAMUSCULAR; INTRAVENOUS; SUBCUTANEOUS
Refills: 0 | Status: DISCONTINUED | OUTPATIENT
Start: 2022-05-12 | End: 2022-05-12

## 2022-05-12 RX ORDER — ASPIRIN/CALCIUM CARB/MAGNESIUM 324 MG
1 TABLET ORAL
Qty: 0 | Refills: 0 | DISCHARGE

## 2022-05-12 RX ORDER — HYDRALAZINE HCL 50 MG
5 TABLET ORAL ONCE
Refills: 0 | Status: COMPLETED | OUTPATIENT
Start: 2022-05-12 | End: 2022-05-12

## 2022-05-12 RX ORDER — METFORMIN HYDROCHLORIDE 850 MG/1
1 TABLET ORAL
Qty: 0 | Refills: 0 | DISCHARGE

## 2022-05-12 RX ORDER — CEPHALEXIN 500 MG
1 CAPSULE ORAL
Qty: 6 | Refills: 0
Start: 2022-05-12 | End: 2022-05-13

## 2022-05-12 RX ORDER — SACUBITRIL AND VALSARTAN 24; 26 MG/1; MG/1
1 TABLET, FILM COATED ORAL
Qty: 0 | Refills: 0 | DISCHARGE

## 2022-05-12 RX ORDER — TAMSULOSIN HYDROCHLORIDE 0.4 MG/1
1 CAPSULE ORAL
Qty: 0 | Refills: 0 | DISCHARGE

## 2022-05-12 RX ADMIN — Medication 5 MILLIGRAM(S): at 13:35

## 2022-05-12 RX ADMIN — TAMSULOSIN HYDROCHLORIDE 0.4 MILLIGRAM(S): 0.4 CAPSULE ORAL at 18:28

## 2022-05-12 RX ADMIN — ONDANSETRON 4 MILLIGRAM(S): 8 TABLET, FILM COATED ORAL at 12:51

## 2022-05-12 NOTE — ASU DISCHARGE PLAN (ADULT/PEDIATRIC) - CARE PROVIDER_API CALL
Krzysztof Blas)  Urology  [address], [address]  [place] [address]  Phone: [phone]  Fax: [phone]  Follow Up Time: 1 week

## 2022-05-12 NOTE — ASU DISCHARGE PLAN (ADULT/PEDIATRIC) - ASU DC SPECIAL INSTRUCTIONSFT
You may have intermittent pink tinged urine and slight flank pain when you urinate.  This is normal and due to the stent in your ureter.   If your urine becomes bright red or with clots, please call the office. Take flomax daily for stent discomfort.     The stent is temporary, and must be eventually removed or exchanged. The stent may cause problems with infection and kidney damage if left in place greater than 3 months.     If requiring Oxycodone for pain, take over the counter stool softeners as needed such as senna, colace, or miralax You may have intermittent pink tinged urine and slight flank pain when you urinate.  This is normal and due to the stent in your ureter.   If your urine becomes bright red or with clots, please call the office. Take flomax daily for stent discomfort.     The stent is temporary, and must be eventually removed or exchanged. The stent may cause problems with infection and kidney damage if left in place greater than 3 months.     If requiring Oxycodone for pain, take over the counter stool softeners as needed such as senna, colace, or miralax  DO NOT take any Tylenol (Acetaminophen) or narcotics containing Tylenol until after  ___5:30pm___ . You received Tylenol during your operation and it can cause damage to your liver if too much is taken within a 24 hour time period.

## 2022-05-12 NOTE — BRIEF OPERATIVE NOTE - NSICDXBRIEFPROCEDURE_GEN_ALL_CORE_FT
PROCEDURES:  Percutaneous removal of calculus of kidney, 2 cm or more in diameter 12-May-2022 12:53:28 left Christiano Rothman

## 2022-05-12 NOTE — ASU PATIENT PROFILE, ADULT - DOMESTIC TRAVEL HIGH RISK QUESTION
4/21/2022    Patient: Sourav Aragon   YOB: 1984   Date of Visit: 4/21/2022     Galo Cisse MD  [address] [address]  Via In Basket    Dear Galo Cisse MD,      Thank you for referring Mr. Libby Dolan to [facility] for evaluation. My notes for this consultation are attached. If you have questions, please do not hesitate to call me. I look forward to following your patient along with you.       Sincerely,    David Ca MD
No

## 2022-05-12 NOTE — ASU DISCHARGE PLAN (ADULT/PEDIATRIC) - CALL YOUR DOCTOR IF YOU HAVE ANY OF THE FOLLOWING:
Fever greater than (need to indicate Fahrenheit or Celsius) Bleeding that does not stop/Fever greater than (need to indicate Fahrenheit or Celsius)/Nausea and vomiting that does not stop/Unable to urinate

## 2022-05-12 NOTE — ASU DISCHARGE PLAN (ADULT/PEDIATRIC) - FOLLOW UP APPOINTMENTS
[facility] PACU &-[phone] open 24/7/[facility], [facility]

## 2022-05-12 NOTE — ASU PATIENT PROFILE, ADULT - NSICDXPASTMEDICALHX_GEN_ALL_CORE_FT
PAST MEDICAL HISTORY:  BPH without urinary obstruction     Calculus of kidney     Dyslipidemia     MI (myocardial infarction) 4 yrs ago    Obesity     Obstructive sleep apnea     Type 2 diabetes mellitus

## 2022-05-13 LAB — GLUCOSE BLDC GLUCOMTR-MCNC: 157 MG/DL — HIGH (ref 70–99)

## 2022-05-18 LAB — NIDUS STONE QN: SIGNIFICANT CHANGE UP

## 2022-06-20 ENCOUNTER — APPOINTMENT (OUTPATIENT)
Dept: ENDOCRINOLOGY | Facility: CLINIC | Age: 62
End: 2022-06-20

## 2023-02-07 NOTE — H&P PST ADULT - RESPIRATORY AND THORAX
Consent 1/Introductory Paragraph: The rationale for Mohs was explained to the patient and consent was obtained. The risks, benefits and alternatives to therapy were discussed in detail. Specifically, the risks of infection, scarring, bleeding, prolonged wound healing, incomplete removal, allergy to anesthesia, nerve injury and recurrence were addressed. Prior to the procedure, the treatment site was clearly identified and confirmed by the patient. All components of Universal Protocol/PAUSE Rule completed. details…

## 2023-05-24 ENCOUNTER — APPOINTMENT (OUTPATIENT)
Dept: INTERNAL MEDICINE | Facility: CLINIC | Age: 63
End: 2023-05-24
Payer: COMMERCIAL

## 2023-05-24 VITALS
BODY MASS INDEX: 29.92 KG/M2 | WEIGHT: 202 LBS | DIASTOLIC BLOOD PRESSURE: 80 MMHG | OXYGEN SATURATION: 98 % | TEMPERATURE: 98 F | HEART RATE: 73 BPM | HEIGHT: 69 IN | SYSTOLIC BLOOD PRESSURE: 140 MMHG | RESPIRATION RATE: 14 BRPM

## 2023-05-24 DIAGNOSIS — J30.2 OTHER SEASONAL ALLERGIC RHINITIS: ICD-10-CM

## 2023-05-24 PROCEDURE — 99213 OFFICE O/P EST LOW 20 MIN: CPT

## 2023-05-24 RX ORDER — SACUBITRIL AND VALSARTAN 49; 51 MG/1; MG/1
49-51 TABLET, FILM COATED ORAL TWICE DAILY
Refills: 0 | Status: DISCONTINUED | COMMUNITY
Start: 2020-08-10 | End: 2023-05-24

## 2023-05-24 RX ORDER — FUROSEMIDE 20 MG/1
20 TABLET ORAL
Refills: 0 | Status: DISCONTINUED | COMMUNITY
Start: 2020-10-05 | End: 2023-05-24

## 2023-05-24 RX ORDER — METFORMIN HYDROCHLORIDE 1000 MG/1
1000 TABLET, COATED ORAL
Qty: 60 | Refills: 2 | Status: DISCONTINUED | COMMUNITY
Start: 2022-03-16 | End: 2023-05-24

## 2023-05-24 RX ORDER — METOPROLOL SUCCINATE 25 MG/1
25 TABLET, EXTENDED RELEASE ORAL
Qty: 90 | Refills: 0 | Status: DISCONTINUED | COMMUNITY
Start: 2020-10-05 | End: 2023-05-24

## 2023-05-24 NOTE — HISTORY OF PRESENT ILLNESS
[Moderate] : moderate [___ Weeks ago] :  [unfilled] weeks ago [Gradual] : gradually [Constant] : constant [Congestion] : congestion [Cough] : cough [Sore Throat] : sore throat [OTC Remedies] : OTC remedies [Stable] : stable [Wheezing] : no wheezing [Chills] : no chills [Anorexia] : no anorexia [Shortness Of Breath] : no shortness of breath [Earache] : no earache [Fatigue] : not fatigue [Headache] : no headache [Fever] : no fever [FreeTextEntry4] : notthing [FreeTextEntry8] : -PMH: T2DM, HTN, HLD, CHF, CAD (s/p VLADIMIR X 1; 2019), H/o CVA (1/2022), COPD, BPH, H/o Nephrolithiasis\par -SH: . 1 children. . Former smoker (Quit 2009). Occasional EtOH use. \par \par ERICK is a 62 year M whom is here today w/ c/o allergies

## 2023-05-24 NOTE — PHYSICAL EXAM
[Normal] : normal rate, regular rhythm, normal S1 and S2 and no murmur heard [de-identified] : right sided tonsilar enlargement

## 2023-06-23 ENCOUNTER — APPOINTMENT (OUTPATIENT)
Dept: INTERNAL MEDICINE | Facility: CLINIC | Age: 63
End: 2023-06-23

## 2024-05-14 ENCOUNTER — APPOINTMENT (OUTPATIENT)
Dept: INTERNAL MEDICINE | Facility: CLINIC | Age: 64
End: 2024-05-14
Payer: COMMERCIAL

## 2024-05-14 ENCOUNTER — NON-APPOINTMENT (OUTPATIENT)
Age: 64
End: 2024-05-14

## 2024-05-14 VITALS
BODY MASS INDEX: 30.81 KG/M2 | HEART RATE: 74 BPM | WEIGHT: 208 LBS | HEIGHT: 69 IN | SYSTOLIC BLOOD PRESSURE: 140 MMHG | RESPIRATION RATE: 14 BRPM | TEMPERATURE: 98 F | DIASTOLIC BLOOD PRESSURE: 78 MMHG | OXYGEN SATURATION: 98 %

## 2024-05-14 DIAGNOSIS — E11.65 TYPE 2 DIABETES MELLITUS WITH HYPERGLYCEMIA: ICD-10-CM

## 2024-05-14 DIAGNOSIS — E66.9 OBESITY, UNSPECIFIED: ICD-10-CM

## 2024-05-14 DIAGNOSIS — E78.5 HYPERLIPIDEMIA, UNSPECIFIED: ICD-10-CM

## 2024-05-14 DIAGNOSIS — I10 ESSENTIAL (PRIMARY) HYPERTENSION: ICD-10-CM

## 2024-05-14 DIAGNOSIS — I25.10 ATHEROSCLEROTIC HEART DISEASE OF NATIVE CORONARY ARTERY W/OUT ANGINA PECTORIS: ICD-10-CM

## 2024-05-14 DIAGNOSIS — Z00.00 ENCOUNTER FOR GENERAL ADULT MEDICAL EXAMINATION W/OUT ABNORMAL FINDINGS: ICD-10-CM

## 2024-05-14 DIAGNOSIS — Z86.73 PERSONAL HISTORY OF TRANSIENT ISCHEMIC ATTACK (TIA), AND CEREBRAL INFARCTION W/OUT RESIDUAL DEFICITS: ICD-10-CM

## 2024-05-14 DIAGNOSIS — Z87.09 PERSONAL HISTORY OF OTHER DISEASES OF THE RESPIRATORY SYSTEM: ICD-10-CM

## 2024-05-14 PROCEDURE — 99396 PREV VISIT EST AGE 40-64: CPT

## 2024-05-14 PROCEDURE — 93000 ELECTROCARDIOGRAM COMPLETE: CPT

## 2024-05-14 PROCEDURE — 36415 COLL VENOUS BLD VENIPUNCTURE: CPT

## 2024-05-14 RX ORDER — ASPIRIN 81 MG
81 TABLET, DELAYED RELEASE (ENTERIC COATED) ORAL
Refills: 0 | Status: ACTIVE | COMMUNITY

## 2024-05-14 RX ORDER — ALFUZOSIN HYDROCHLORIDE 10 MG/1
10 TABLET, EXTENDED RELEASE ORAL
Refills: 0 | Status: DISCONTINUED | COMMUNITY
End: 2024-05-14

## 2024-05-14 RX ORDER — METHYLPREDNISOLONE 4 MG/1
4 TABLET ORAL
Qty: 1 | Refills: 0 | Status: DISCONTINUED | COMMUNITY
Start: 2023-05-24 | End: 2024-05-14

## 2024-05-14 RX ORDER — ATORVASTATIN CALCIUM 10 MG/1
10 TABLET, FILM COATED ORAL
Qty: 90 | Refills: 1 | Status: DISCONTINUED | COMMUNITY
End: 2024-05-14

## 2024-05-15 LAB
ALBUMIN SERPL ELPH-MCNC: 4.5 G/DL
ALP BLD-CCNC: 56 U/L
ALT SERPL-CCNC: 14 U/L
ANION GAP SERPL CALC-SCNC: 11 MMOL/L
AST SERPL-CCNC: 16 U/L
BASOPHILS # BLD AUTO: 0.06 K/UL
BASOPHILS NFR BLD AUTO: 0.8 %
BILIRUB SERPL-MCNC: 0.7 MG/DL
BUN SERPL-MCNC: 9 MG/DL
CALCIUM SERPL-MCNC: 9.7 MG/DL
CHLORIDE SERPL-SCNC: 104 MMOL/L
CO2 SERPL-SCNC: 26 MMOL/L
CREAT SERPL-MCNC: 0.74 MG/DL
CREAT SPEC-SCNC: 164 MG/DL
EGFR: 101 ML/MIN/1.73M2
EOSINOPHIL # BLD AUTO: 0.26 K/UL
EOSINOPHIL NFR BLD AUTO: 3.4 %
GLUCOSE SERPL-MCNC: 110 MG/DL
HCT VFR BLD CALC: 41.4 %
HGB BLD-MCNC: 13.5 G/DL
IMM GRANULOCYTES NFR BLD AUTO: 0.3 %
LYMPHOCYTES # BLD AUTO: 2.3 K/UL
LYMPHOCYTES NFR BLD AUTO: 29.9 %
MAN DIFF?: NORMAL
MCHC RBC-ENTMCNC: 30.3 PG
MCHC RBC-ENTMCNC: 32.6 GM/DL
MCV RBC AUTO: 93 FL
MICROALBUMIN 24H UR DL<=1MG/L-MCNC: 2 MG/DL
MICROALBUMIN/CREAT 24H UR-RTO: 12 MG/G
MONOCYTES # BLD AUTO: 0.66 K/UL
MONOCYTES NFR BLD AUTO: 8.6 %
NEUTROPHILS # BLD AUTO: 4.38 K/UL
NEUTROPHILS NFR BLD AUTO: 57 %
PLATELET # BLD AUTO: 313 K/UL
POTASSIUM SERPL-SCNC: 5 MMOL/L
PROT SERPL-MCNC: 7 G/DL
PSA SERPL-MCNC: 0.99 NG/ML
RBC # BLD: 4.45 M/UL
RBC # FLD: 13.2 %
SODIUM SERPL-SCNC: 141 MMOL/L
TSH SERPL-ACNC: 3.87 UIU/ML
WBC # FLD AUTO: 7.68 K/UL

## 2024-05-16 LAB
CHOLEST SERPL-MCNC: 185 MG/DL
ESTIMATED AVERAGE GLUCOSE: 169 MG/DL
HBA1C MFR BLD HPLC: 7.5 %
HDLC SERPL-MCNC: 51 MG/DL
LDLC SERPL CALC-MCNC: 115 MG/DL
NONHDLC SERPL-MCNC: 135 MG/DL
TRIGL SERPL-MCNC: 107 MG/DL

## 2024-05-16 RX ORDER — ATORVASTATIN CALCIUM 10 MG/1
10 TABLET, FILM COATED ORAL
Qty: 90 | Refills: 0 | Status: ACTIVE | COMMUNITY
Start: 2024-05-16 | End: 1900-01-01

## 2024-05-16 NOTE — ASSESSMENT
[FreeTextEntry1] : -PMH: HTN, HLD, CAD (s/p Stent X 1; 2019), T2DM, BPH, H/o Nephrolithiasis -SH: . 1 children. . Former smoker (Quit 2009). Occasional EtOH use.   ERICK is a 63 year M whom is here today for annual well check  Specialists Involved: -Endo: Dr. Bianchi  -Cardio: Dr. Yoder -Uro: Dr. Roche -GI: Dr. Candelaria -Optho: [place]  EKG obtained in office today demonstrates sinus bradycardia with a rate of 51.  Left axis deviation.  T wave inversions leads III and aVF, V5 and V6.  Poor R wave progression.  I do not have any prior EKGs for comparison in the setting of an abnormal EKG in a patient with known CAD status post PCI and known ischemic cardiomyopathy. Denies CP, SOB, LE Edema or Orthopnea. Recommend f/u w/ Cardio ASAP   -F/u labs drawn in office today -Further recs pending lab results -Continue annual optho screening -GI consultation for routine colon ca screening -F/u Cardio  -RTO 6mo for routine f/u or sooner if needed

## 2024-05-16 NOTE — HISTORY OF PRESENT ILLNESS
[FreeTextEntry1] : annual well check [de-identified] : -PMH: HTN, HLD, CAD (s/p Stent X 1; 2019), T2DM, BPH, H/o Nephrolithiasis -SH: . 1 children. . Former smoker (Quit 2009). Occasional EtOH use.   ERICK is a 63 year M whom is here today for annual well check Of note, patient has been highly noncompliant and recommended follow-up with myself, his cardiologist and endocrinologist  Specialists Involved: -Endo: Dr. Bianchi  -Cardio: Dr. Yoder -Uro: Dr. Zach Malave -GI:  ? -Optho: Zach Malave  -Vaccines: Needs Prevnar 20, Shingles -Colonoscopy: 8/2019. Adenomatous Polyps noted. Repeat 3yrs -FH of Prostate, Colon, breast or ovarian CA: None known  -HTN, HLD, CAD: S/p Stent X 1; 2019. Remains on ASA. Self DCd Atorvastatin. Non-compliant in recommended f/u w/ Cardio.   -T2DM: No longer on medication. Diet controlled. No longer checks his BG levels or follows w/ Endo. 9/2021 A1c 7.9. (4/2024) Optho Eval: No Retinopathy   -BPH: No longer on medication. Follows w/ Uro Q 1yr

## 2024-05-16 NOTE — ADDENDUM
[FreeTextEntry1] : Type 2 diabetes is uncontrolled off medication.  A1c 7.5.  Recommend metformin 500 mg twice daily Recommend restarting every morning blood glucose monitoring RTO 3 months sooner if needed if blood glucose levels are not at goal.  Goal blood glucose level while fasting is less than 130  Recommended restarting statin therapy Lipitor 10 mg

## 2024-05-16 NOTE — HEALTH RISK ASSESSMENT
[Yes] : Yes [Monthly or less (1 pt)] : Monthly or less (1 point) [1 or 2 (0 pts)] : 1 or 2 (0 points) [Never (0 pts)] : Never (0 points) [No] : In the past 12 months have you used drugs other than those required for medical reasons? No [No falls in past year] : Patient reported no falls in the past year [0] : 2) Feeling down, depressed, or hopeless: Not at all (0) [HIV test declined] : HIV test declined [Hepatitis C test declined] : Hepatitis C test declined [Alone] : lives alone [Employed] : employed [] :  [# Of Children ___] : has [unfilled] children [Fully functional (bathing, dressing, toileting, transferring, walking, feeding)] : Fully functional (bathing, dressing, toileting, transferring, walking, feeding) [Fully functional (using the telephone, shopping, preparing meals, housekeeping, doing laundry, using] : Fully functional and needs no help or supervision to perform IADLs (using the telephone, shopping, preparing meals, housekeeping, doing laundry, using transportation, managing medications and managing finances) [Reviewed no changes] : Reviewed, no changes [Excellent] : ~his/her~  mood as  excellent [PHQ-2 Negative - No further assessment needed] : PHQ-2 Negative - No further assessment needed [No Retinopathy] : No retinopathy [Patient reported colonoscopy was abnormal] : Patient reported colonoscopy was abnormal [Former] : Former [10-14] : 10-14 [> 15 Years] : > 15 Years [Audit-CScore] : 1 [CXF0Eljci] : 0 [EyeExamDate] : 04/24 [Change in mental status noted] : No change in mental status noted [ColonoscopyDate] : 08/19 [AdvancecareDate] : 05/24

## 2024-05-17 RX ORDER — METFORMIN HYDROCHLORIDE 500 MG/1
500 TABLET, COATED ORAL TWICE DAILY
Qty: 180 | Refills: 0 | Status: ACTIVE | COMMUNITY
Start: 2024-05-17 | End: 1900-01-01

## 2024-06-13 ENCOUNTER — APPOINTMENT (OUTPATIENT)
Dept: INTERNAL MEDICINE | Facility: CLINIC | Age: 64
End: 2024-06-13

## 2024-08-30 ENCOUNTER — APPOINTMENT (OUTPATIENT)
Dept: GASTROENTEROLOGY | Facility: CLINIC | Age: 64
End: 2024-08-30
Payer: COMMERCIAL

## 2024-08-30 VITALS
BODY MASS INDEX: 30.36 KG/M2 | SYSTOLIC BLOOD PRESSURE: 143 MMHG | DIASTOLIC BLOOD PRESSURE: 89 MMHG | HEART RATE: 71 BPM | TEMPERATURE: 98.1 F | RESPIRATION RATE: 14 BRPM | WEIGHT: 205 LBS | HEIGHT: 69 IN | OXYGEN SATURATION: 97 %

## 2024-08-30 DIAGNOSIS — R13.10 DYSPHAGIA, UNSPECIFIED: ICD-10-CM

## 2024-08-30 DIAGNOSIS — Z12.11 ENCOUNTER FOR SCREENING FOR MALIGNANT NEOPLASM OF COLON: ICD-10-CM

## 2024-08-30 DIAGNOSIS — Z98.84 BARIATRIC SURGERY STATUS: ICD-10-CM

## 2024-08-30 PROCEDURE — 99203 OFFICE O/P NEW LOW 30 MIN: CPT

## 2024-08-30 RX ORDER — SODIUM SULFATE, POTASSIUM SULFATE AND MAGNESIUM SULFATE 1.6; 3.13; 17.5 G/177ML; G/177ML; G/177ML
17.5-3.13-1.6 SOLUTION ORAL
Qty: 1 | Refills: 0 | Status: ACTIVE | COMMUNITY
Start: 2024-08-30 | End: 1900-01-01

## 2024-08-30 NOTE — PHYSICAL EXAM
[Alert] : alert [Normal Voice/Communication] : normal voice/communication [No Acute Distress] : no acute distress [Sclera] : the sclera and conjunctiva were normal [Hearing Threshold Finger Rub Not Mobile] : hearing was normal [Normal Lips/Gums] : the lips and gums were normal [Oropharynx] : the oropharynx was normal [Normal Appearance] : the appearance of the neck was normal [No Neck Mass] : no neck mass was observed [No Respiratory Distress] : no respiratory distress [No Acc Muscle Use] : no accessory muscle use [Respiration, Rhythm And Depth] : normal respiratory rhythm and effort [Auscultation Breath Sounds / Voice Sounds] : lungs were clear to auscultation bilaterally [Heart Rate And Rhythm] : heart rate was normal and rhythm regular [None] : no edema [Bowel Sounds] : normal bowel sounds [Abdomen Tenderness] : non-tender [No Masses] : no abdominal mass palpated [Abdomen Soft] : soft [Cervical Lymph Nodes Enlarged Posterior Bilaterally] : no posterior cervical lymphadenopathy [Supraclavicular Lymph Nodes Enlarged Bilaterally] : no supraclavicular lymphadenopathy [Cervical Lymph Nodes Enlarged Anterior Bilaterally] : no anterior cervical lymphadenopathy [No CVA Tenderness] : no CVA  tenderness [No Spinal Tenderness] : no spinal tenderness [Abnormal Walk] : normal gait [Normal Color / Pigmentation] : normal skin color and pigmentation [No Focal Deficits] : no focal deficits [Oriented To Time, Place, And Person] : oriented to person, place, and time

## 2024-08-30 NOTE — ASSESSMENT
[FreeTextEntry1] : 63-year-old male with a history of coronary artery disease, gastric bypass for obesity who presents for colon cancer screening   Colon cancer screening History of polyps Procedure risks and benefits reviewed with patient Bowel prep sent to pharmacy He is stable without evidence of decompensation and is not on antiplatelet/anticoagulation  Potential dysphagia He is modified his diet so he does not have much exposure to solid foods Will obtain an esophagram if it is abnormal we will add an endoscopy to the colonoscopy otherwise he has no issues with his current diet and is satisfied with his ongoing weight loss

## 2024-10-23 ENCOUNTER — APPOINTMENT (OUTPATIENT)
Dept: GASTROENTEROLOGY | Facility: GI CENTER | Age: 64
End: 2024-10-23

## 2024-10-23 DIAGNOSIS — Z12.11 ENCOUNTER FOR SCREENING FOR MALIGNANT NEOPLASM OF COLON: ICD-10-CM

## 2025-04-09 ENCOUNTER — APPOINTMENT (OUTPATIENT)
Dept: INTERNAL MEDICINE | Facility: CLINIC | Age: 65
End: 2025-04-09
Payer: COMMERCIAL

## 2025-04-09 VITALS
DIASTOLIC BLOOD PRESSURE: 70 MMHG | WEIGHT: 214 LBS | RESPIRATION RATE: 14 BRPM | HEART RATE: 84 BPM | SYSTOLIC BLOOD PRESSURE: 140 MMHG | TEMPERATURE: 98 F | HEIGHT: 69 IN | OXYGEN SATURATION: 96 % | BODY MASS INDEX: 31.7 KG/M2

## 2025-04-09 DIAGNOSIS — I10 ESSENTIAL (PRIMARY) HYPERTENSION: ICD-10-CM

## 2025-04-09 DIAGNOSIS — Z01.818 ENCOUNTER FOR OTHER PREPROCEDURAL EXAMINATION: ICD-10-CM

## 2025-04-09 DIAGNOSIS — E66.9 OBESITY, UNSPECIFIED: ICD-10-CM

## 2025-04-09 DIAGNOSIS — E78.5 HYPERLIPIDEMIA, UNSPECIFIED: ICD-10-CM

## 2025-04-09 DIAGNOSIS — E11.65 TYPE 2 DIABETES MELLITUS WITH HYPERGLYCEMIA: ICD-10-CM

## 2025-04-09 DIAGNOSIS — I25.10 ATHEROSCLEROTIC HEART DISEASE OF NATIVE CORONARY ARTERY W/OUT ANGINA PECTORIS: ICD-10-CM

## 2025-04-09 PROCEDURE — G2211 COMPLEX E/M VISIT ADD ON: CPT | Mod: NC

## 2025-04-09 PROCEDURE — 99214 OFFICE O/P EST MOD 30 MIN: CPT

## 2025-04-09 RX ORDER — SACUBITRIL AND VALSARTAN 97; 103 MG/1; MG/1
97-103 TABLET, FILM COATED ORAL TWICE DAILY
Refills: 0 | Status: ACTIVE | COMMUNITY
Start: 2025-04-09

## 2025-07-09 ENCOUNTER — APPOINTMENT (OUTPATIENT)
Dept: INTERNAL MEDICINE | Facility: CLINIC | Age: 65
End: 2025-07-09

## 2025-07-15 ENCOUNTER — RX RENEWAL (OUTPATIENT)
Age: 65
End: 2025-07-15

## 2025-08-13 ENCOUNTER — APPOINTMENT (OUTPATIENT)
Dept: ORTHOPEDIC SURGERY | Facility: CLINIC | Age: 65
End: 2025-08-13
Payer: COMMERCIAL

## 2025-08-13 VITALS
BODY MASS INDEX: 31.1 KG/M2 | WEIGHT: 210 LBS | SYSTOLIC BLOOD PRESSURE: 129 MMHG | DIASTOLIC BLOOD PRESSURE: 71 MMHG | HEIGHT: 69 IN

## 2025-08-13 DIAGNOSIS — Z98.84 BARIATRIC SURGERY STATUS: ICD-10-CM

## 2025-08-13 DIAGNOSIS — M17.11 UNILATERAL PRIMARY OSTEOARTHRITIS, RIGHT KNEE: ICD-10-CM

## 2025-08-13 DIAGNOSIS — E11.65 TYPE 2 DIABETES MELLITUS WITH HYPERGLYCEMIA: ICD-10-CM

## 2025-08-13 PROCEDURE — 20610 DRAIN/INJ JOINT/BURSA W/O US: CPT | Mod: RT

## 2025-08-13 PROCEDURE — 99205 OFFICE O/P NEW HI 60 MIN: CPT | Mod: 25

## 2025-08-13 PROCEDURE — 73564 X-RAY EXAM KNEE 4 OR MORE: CPT | Mod: RT

## (undated) DEVICE — PREP BETADINE KIT

## (undated) DEVICE — POSITIONER STRAP ARMBOARD VELCRO TS-30

## (undated) DEVICE — SYR CATH TIP 2 OZ

## (undated) DEVICE — Device

## (undated) DEVICE — TUBING SUCTION NONCONDUCTIVE 6MM X 12FT

## (undated) DEVICE — PREP CHLORAPREP HI-LITE ORANGE 26ML

## (undated) DEVICE — PREP BETADINE SPONGE STICKS

## (undated) DEVICE — TUBING LEVEL ONE NORMOFLO SET

## (undated) DEVICE — NDL BIOPSY TROCAR TWO-PART 18G X 20CM

## (undated) DEVICE — CANISTER DISPOSABLE THIN WALL 3000CC

## (undated) DEVICE — SYR LUER LOK 20CC

## (undated) DEVICE — SUT SILK 2-0 30" SH

## (undated) DEVICE — DEVICE INFLATION AERIS DISP

## (undated) DEVICE — DRAPE NEPHROSCOPY 72X118"

## (undated) DEVICE — BASIN SET DOUBLE

## (undated) DEVICE — GOWN LG

## (undated) DEVICE — WARMING BLANKET FULL ADULT

## (undated) DEVICE — GOWN XL

## (undated) DEVICE — GLV 7.5 PROTEXIS (CREAM) MICRO

## (undated) DEVICE — DRAPE COVER SNAP 36X30"

## (undated) DEVICE — POSITIONER BLUE BOLSTER

## (undated) DEVICE — SOL IRR POUR H2O 500ML

## (undated) DEVICE — POSITIONER HEAD REST PRONE

## (undated) DEVICE — VENODYNE/SCD SLEEVE CALF MEDIUM

## (undated) DEVICE — TUBING SET EXTENSION 60" VOL 0.4ML

## (undated) DEVICE — SOL IRR BAG NS 0.9% 3000ML

## (undated) DEVICE — DRSG CURITY GAUZE SPONGE 4 X 4" 12-PLY

## (undated) DEVICE — FOLEY CATH 2-WAY 16FR 5CC SILASTIC

## (undated) DEVICE — LABELS BLANK W PEN